# Patient Record
Sex: FEMALE | Race: WHITE | NOT HISPANIC OR LATINO | Employment: OTHER | URBAN - METROPOLITAN AREA
[De-identification: names, ages, dates, MRNs, and addresses within clinical notes are randomized per-mention and may not be internally consistent; named-entity substitution may affect disease eponyms.]

---

## 2020-04-27 ENCOUNTER — OFFICE VISIT (OUTPATIENT)
Dept: FAMILY MEDICINE CLINIC | Facility: CLINIC | Age: 55
End: 2020-04-27

## 2020-04-27 VITALS
SYSTOLIC BLOOD PRESSURE: 126 MMHG | WEIGHT: 232.4 LBS | HEIGHT: 66 IN | TEMPERATURE: 99.2 F | BODY MASS INDEX: 37.35 KG/M2 | DIASTOLIC BLOOD PRESSURE: 76 MMHG | HEART RATE: 72 BPM | RESPIRATION RATE: 18 BRPM

## 2020-04-27 DIAGNOSIS — M25.561 CHRONIC PAIN OF RIGHT KNEE: ICD-10-CM

## 2020-04-27 DIAGNOSIS — M54.50 LUMBAR BACK PAIN: ICD-10-CM

## 2020-04-27 DIAGNOSIS — M25.562 CHRONIC PAIN OF LEFT KNEE: Primary | ICD-10-CM

## 2020-04-27 DIAGNOSIS — I10 ESSENTIAL HYPERTENSION: ICD-10-CM

## 2020-04-27 DIAGNOSIS — G89.29 CHRONIC PAIN OF LEFT KNEE: Primary | ICD-10-CM

## 2020-04-27 DIAGNOSIS — G62.9 NEUROPATHY: ICD-10-CM

## 2020-04-27 DIAGNOSIS — G89.29 CHRONIC PAIN OF RIGHT KNEE: ICD-10-CM

## 2020-04-27 DIAGNOSIS — M25.552 LEFT HIP PAIN: ICD-10-CM

## 2020-04-27 LAB
ALBUMIN SERPL-MCNC: 4.4 G/DL (ref 3.5–5.2)
ALBUMIN/GLOB SERPL: 1.6 G/DL
ALP SERPL-CCNC: 69 U/L (ref 39–117)
ALT SERPL-CCNC: 21 U/L (ref 1–33)
AST SERPL-CCNC: 17 U/L (ref 1–32)
BASOPHILS # BLD AUTO: 0.05 10*3/MM3 (ref 0–0.2)
BASOPHILS NFR BLD AUTO: 1 % (ref 0–1.5)
BILIRUB SERPL-MCNC: 0.4 MG/DL (ref 0.2–1.2)
BUN SERPL-MCNC: 19 MG/DL (ref 6–20)
BUN/CREAT SERPL: 20.9 (ref 7–25)
CALCIUM SERPL-MCNC: 9.4 MG/DL (ref 8.6–10.5)
CHLORIDE SERPL-SCNC: 98 MMOL/L (ref 98–107)
CHOLEST SERPL-MCNC: 236 MG/DL (ref 0–200)
CHOLEST/HDLC SERPL: 3.52 {RATIO}
CO2 SERPL-SCNC: 28.2 MMOL/L (ref 22–29)
CREAT SERPL-MCNC: 0.91 MG/DL (ref 0.57–1)
EOSINOPHIL # BLD AUTO: 0.31 10*3/MM3 (ref 0–0.4)
EOSINOPHIL NFR BLD AUTO: 6 % (ref 0.3–6.2)
ERYTHROCYTE [DISTWIDTH] IN BLOOD BY AUTOMATED COUNT: 11.8 % (ref 12.3–15.4)
GLOBULIN SER CALC-MCNC: 2.8 GM/DL
GLUCOSE SERPL-MCNC: 98 MG/DL (ref 65–99)
HCT VFR BLD AUTO: 38.8 % (ref 34–46.6)
HDLC SERPL-MCNC: 67 MG/DL (ref 40–60)
HGB BLD-MCNC: 12.8 G/DL (ref 12–15.9)
IMM GRANULOCYTES # BLD AUTO: 0.02 10*3/MM3 (ref 0–0.05)
IMM GRANULOCYTES NFR BLD AUTO: 0.4 % (ref 0–0.5)
LDLC SERPL CALC-MCNC: 137 MG/DL (ref 0–100)
LYMPHOCYTES # BLD AUTO: 1.83 10*3/MM3 (ref 0.7–3.1)
LYMPHOCYTES NFR BLD AUTO: 35.7 % (ref 19.6–45.3)
MCH RBC QN AUTO: 29.7 PG (ref 26.6–33)
MCHC RBC AUTO-ENTMCNC: 33 G/DL (ref 31.5–35.7)
MCV RBC AUTO: 90 FL (ref 79–97)
MONOCYTES # BLD AUTO: 0.34 10*3/MM3 (ref 0.1–0.9)
MONOCYTES NFR BLD AUTO: 6.6 % (ref 5–12)
NEUTROPHILS # BLD AUTO: 2.58 10*3/MM3 (ref 1.7–7)
NEUTROPHILS NFR BLD AUTO: 50.3 % (ref 42.7–76)
NRBC BLD AUTO-RTO: 0 /100 WBC (ref 0–0.2)
PLATELET # BLD AUTO: 265 10*3/MM3 (ref 140–450)
POTASSIUM SERPL-SCNC: 4.3 MMOL/L (ref 3.5–5.2)
PROT SERPL-MCNC: 7.2 G/DL (ref 6–8.5)
RBC # BLD AUTO: 4.31 10*6/MM3 (ref 3.77–5.28)
SODIUM SERPL-SCNC: 136 MMOL/L (ref 136–145)
TRIGL SERPL-MCNC: 161 MG/DL (ref 0–150)
VLDLC SERPL CALC-MCNC: 32.2 MG/DL (ref 5–40)
WBC # BLD AUTO: 5.13 10*3/MM3 (ref 3.4–10.8)

## 2020-04-27 PROCEDURE — 99203 OFFICE O/P NEW LOW 30 MIN: CPT | Performed by: FAMILY MEDICINE

## 2020-04-27 RX ORDER — MELOXICAM 15 MG/1
15 TABLET ORAL
COMMUNITY
End: 2020-05-28 | Stop reason: SDUPTHER

## 2020-04-27 RX ORDER — GABAPENTIN 600 MG/1
600 TABLET ORAL 2 TIMES DAILY
Qty: 60 TABLET | Refills: 0 | COMMUNITY
Start: 2020-04-27 | End: 2020-05-26 | Stop reason: SDUPTHER

## 2020-04-27 RX ORDER — LISINOPRIL AND HYDROCHLOROTHIAZIDE 20; 12.5 MG/1; MG/1
1 TABLET ORAL DAILY
COMMUNITY
Start: 2020-04-27 | End: 2020-05-26 | Stop reason: SDUPTHER

## 2020-04-27 RX ORDER — GABAPENTIN 800 MG/1
800 TABLET ORAL 2 TIMES DAILY
COMMUNITY
End: 2020-04-27

## 2020-04-27 RX ORDER — ACETAMINOPHEN 500 MG
500 TABLET ORAL EVERY 6 HOURS PRN
COMMUNITY
End: 2021-03-01
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
## 2020-05-26 DIAGNOSIS — M25.562 CHRONIC PAIN OF LEFT KNEE: ICD-10-CM

## 2020-05-26 DIAGNOSIS — G62.9 NEUROPATHY: ICD-10-CM

## 2020-05-26 DIAGNOSIS — G89.29 CHRONIC PAIN OF LEFT KNEE: ICD-10-CM

## 2020-05-26 RX ORDER — LISINOPRIL AND HYDROCHLOROTHIAZIDE 20; 12.5 MG/1; MG/1
1 TABLET ORAL DAILY
Qty: 30 TABLET | Refills: 1 | Status: SHIPPED | OUTPATIENT
Start: 2020-05-26 | End: 2020-07-02 | Stop reason: SDUPTHER

## 2020-05-26 RX ORDER — GABAPENTIN 600 MG/1
600 TABLET ORAL 2 TIMES DAILY
Qty: 60 TABLET | Refills: 0 | Status: SHIPPED | OUTPATIENT
Start: 2020-05-26 | End: 2020-06-25 | Stop reason: SDUPTHER

## 2020-05-28 RX ORDER — MELOXICAM 15 MG/1
15 TABLET ORAL DAILY
Qty: 30 TABLET | Refills: 1 | Status: SHIPPED | OUTPATIENT
Start: 2020-05-28 | End: 2020-06-25

## 2020-06-01 ENCOUNTER — TELEMEDICINE (OUTPATIENT)
Dept: FAMILY MEDICINE CLINIC | Facility: CLINIC | Age: 55
End: 2020-06-01

## 2020-06-01 DIAGNOSIS — G89.29 CHRONIC PAIN OF RIGHT KNEE: ICD-10-CM

## 2020-06-01 DIAGNOSIS — F32.A ANXIETY AND DEPRESSION: ICD-10-CM

## 2020-06-01 DIAGNOSIS — M25.562 CHRONIC PAIN OF LEFT KNEE: Primary | ICD-10-CM

## 2020-06-01 DIAGNOSIS — G89.29 CHRONIC PAIN OF LEFT KNEE: Primary | ICD-10-CM

## 2020-06-01 DIAGNOSIS — M54.50 LUMBAR BACK PAIN: ICD-10-CM

## 2020-06-01 DIAGNOSIS — M25.561 CHRONIC PAIN OF RIGHT KNEE: ICD-10-CM

## 2020-06-01 DIAGNOSIS — F41.9 ANXIETY AND DEPRESSION: ICD-10-CM

## 2020-06-01 PROCEDURE — 99213 OFFICE O/P EST LOW 20 MIN: CPT | Performed by: FAMILY MEDICINE

## 2020-06-01 RX ORDER — TRAMADOL HYDROCHLORIDE 50 MG/1
50 TABLET ORAL EVERY 6 HOURS PRN
Qty: 60 TABLET | Refills: 0 | Status: SHIPPED | OUTPATIENT
Start: 2020-06-01 | End: 2020-06-08 | Stop reason: SDUPTHER

## 2020-06-01 RX ORDER — TIZANIDINE 4 MG/1
4-8 TABLET ORAL NIGHTLY PRN
Qty: 60 TABLET | Refills: 1 | Status: SHIPPED | OUTPATIENT
Start: 2020-06-01 | End: 2020-07-20 | Stop reason: SDUPTHER

## 2020-06-01 RX ORDER — ESCITALOPRAM OXALATE 10 MG/1
10 TABLET ORAL DAILY
Qty: 30 TABLET | Refills: 1 | Status: SHIPPED | OUTPATIENT
Start: 2020-06-01 | End: 2020-07-14

## 2020-06-01 NOTE — PROGRESS NOTES
This was an audio and video enabled telemedicine encounter.     You have chosen to receive care through a telehealth visit.  Do you consent to use a video/audio connection for your medical care today? Yes     Time spent: 12 min total with patient in discussion  .  Assessment/Plan       Problems Addressed this Visit     None      Visit Diagnoses     Chronic pain of left knee    -  Primary    Relevant Medications    traMADol (ULTRAM) 50 MG tablet    tiZANidine (ZANAFLEX) 4 MG tablet    Chronic pain of right knee        Relevant Medications    traMADol (ULTRAM) 50 MG tablet    tiZANidine (ZANAFLEX) 4 MG tablet    Lumbar back pain        Relevant Medications    traMADol (ULTRAM) 50 MG tablet    tiZANidine (ZANAFLEX) 4 MG tablet    Anxiety and depression        Relevant Medications    escitalopram (Lexapro) 10 MG tablet            Follow up: Return in about 1 month (around 7/1/2020).     DISCUSSION  Chronic pain of left and right knee.  Has had multiple surgeries.  Lumbar back pain.  Previously did well on tramadol 50 mg twice a day.  Will refill this.  Trial of tizanidine as well.  Follow-up in the office in 1 month for chronic pain management and will need a pain management agreement.    Anxiety and depression.  Trial of Lexapro.  Denies suicidal ideation.            MEDICATIONS PRESCRIBED  Requested Prescriptions     Signed Prescriptions Disp Refills   • traMADol (ULTRAM) 50 MG tablet 60 tablet 0     Sig: Take 1 tablet by mouth Every 6 (Six) Hours As Needed for Moderate Pain .   • tiZANidine (ZANAFLEX) 4 MG tablet 60 tablet 1     Sig: Take 1-2 tablets by mouth At Night As Needed for Muscle Spasms.   • escitalopram (Lexapro) 10 MG tablet 30 tablet 1     Sig: Take 1 tablet by mouth Daily.             Cristiano dated on April 27, 2020 was reviewed and appropriate.        -------------------------------------------    Subjective     Chief Complaint   Patient presents with   • Pain         History of Present  Illness    Arthritis/pain  Pain still  Had knee surg, 3 knee surg left knee  Was told needed right TKR  Meloxicam helps with the stiffness  Does not help with the pain    Ultram in the past  Was taking 2 per day  Stopped taking when moved here to [place]  Tylenol was not helpful    Has no had since July 2018      Lower back  Muscled getting tight  Walking with a limp   burning pain in leg and numbness in toes  Left leg    Anxiety  Had been on celexa   Then changed to Lexapro  Increased anxiety now  Situation was better and then stopped the meds  Now + anxiety and irritable again  Stays tense and worry all the time  No suicidal ideation  Some depressed mood as well          Social History     Tobacco Use   Smoking Status Never Smoker   Smokeless Tobacco Never Used          Past Medical History,Medications, Allergies, and social history was reviewed.          Review of Systems   Constitutional: Negative.    HENT: Negative.    Respiratory: Negative.    Cardiovascular: Negative.    Musculoskeletal: Positive for arthralgias and back pain.   Psychiatric/Behavioral: Positive for depressed mood. Negative for suicidal ideas. The patient is nervous/anxious.        Objective     Unable to do vital signs since video visit      Physical Exam   Constitutional: She appears well-developed and well-nourished. No distress.   HENT:   Head: Normocephalic.   Eyes: EOM are normal.   Neck: Neck normal appearance.  Pulmonary/Chest: Effort normal.   Neurological: She is alert.   Psychiatric: She has a normal mood and affect. Her speech is normal.          Unable to do full physical examination due to video visit            Dl Lucas MD

## 2020-06-05 DIAGNOSIS — F41.9 ANXIETY AND DEPRESSION: Primary | ICD-10-CM

## 2020-06-05 DIAGNOSIS — F32.A ANXIETY AND DEPRESSION: Primary | ICD-10-CM

## 2020-06-05 RX ORDER — BUPROPION HYDROCHLORIDE 150 MG/1
TABLET, EXTENDED RELEASE ORAL
Qty: 60 TABLET | Refills: 1 | Status: SHIPPED | OUTPATIENT
Start: 2020-06-05 | End: 2020-07-14

## 2020-06-08 DIAGNOSIS — M25.561 CHRONIC PAIN OF RIGHT KNEE: ICD-10-CM

## 2020-06-08 DIAGNOSIS — M25.562 CHRONIC PAIN OF LEFT KNEE: ICD-10-CM

## 2020-06-08 DIAGNOSIS — G89.29 CHRONIC PAIN OF LEFT KNEE: ICD-10-CM

## 2020-06-08 DIAGNOSIS — M54.50 LUMBAR BACK PAIN: ICD-10-CM

## 2020-06-08 DIAGNOSIS — G89.29 CHRONIC PAIN OF RIGHT KNEE: ICD-10-CM

## 2020-06-08 RX ORDER — TRAMADOL HYDROCHLORIDE 50 MG/1
50 TABLET ORAL EVERY 6 HOURS PRN
Qty: 60 TABLET | Refills: 2 | Status: SHIPPED | OUTPATIENT
Start: 2020-06-08 | End: 2020-07-20 | Stop reason: SDUPTHER

## 2020-06-08 RX ORDER — TRAMADOL HYDROCHLORIDE 50 MG/1
TABLET ORAL
Qty: 60 TABLET | Refills: 0 | OUTPATIENT
Start: 2020-06-08

## 2020-06-18 DIAGNOSIS — F32.A ANXIETY AND DEPRESSION: Primary | ICD-10-CM

## 2020-06-18 DIAGNOSIS — F41.9 ANXIETY AND DEPRESSION: Primary | ICD-10-CM

## 2020-06-18 RX ORDER — QUETIAPINE FUMARATE 25 MG/1
25 TABLET, FILM COATED ORAL NIGHTLY
Qty: 30 TABLET | Refills: 1 | Status: SHIPPED | OUTPATIENT
Start: 2020-06-18 | End: 2020-07-14

## 2020-06-22 DIAGNOSIS — G89.29 CHRONIC PAIN OF RIGHT KNEE: ICD-10-CM

## 2020-06-22 DIAGNOSIS — M25.561 CHRONIC PAIN OF RIGHT KNEE: ICD-10-CM

## 2020-06-22 DIAGNOSIS — M25.562 CHRONIC PAIN OF LEFT KNEE: ICD-10-CM

## 2020-06-22 DIAGNOSIS — M54.50 LUMBAR BACK PAIN: ICD-10-CM

## 2020-06-22 DIAGNOSIS — G89.29 CHRONIC PAIN OF LEFT KNEE: ICD-10-CM

## 2020-06-22 RX ORDER — TRAMADOL HYDROCHLORIDE 50 MG/1
50 TABLET ORAL EVERY 6 HOURS PRN
Qty: 60 TABLET | Refills: 2 | Status: CANCELLED | OUTPATIENT
Start: 2020-06-22

## 2020-06-22 NOTE — TELEPHONE ENCOUNTER
Please call patient.  We received a refill request for tramadol but she got #60 on June 8, 2020 +2 refills.  That would be 180 pills used in 14 days.  Please confirm.

## 2020-06-25 DIAGNOSIS — G89.29 CHRONIC PAIN OF LEFT KNEE: ICD-10-CM

## 2020-06-25 DIAGNOSIS — G62.9 NEUROPATHY: ICD-10-CM

## 2020-06-25 DIAGNOSIS — M25.562 CHRONIC PAIN OF LEFT KNEE: ICD-10-CM

## 2020-06-25 RX ORDER — GABAPENTIN 600 MG/1
TABLET ORAL
Qty: 60 TABLET | Refills: 0 | OUTPATIENT
Start: 2020-06-25

## 2020-06-25 RX ORDER — GABAPENTIN 600 MG/1
600 TABLET ORAL 2 TIMES DAILY
Qty: 60 TABLET | Refills: 0 | Status: CANCELLED | OUTPATIENT
Start: 2020-06-25

## 2020-06-25 RX ORDER — GABAPENTIN 600 MG/1
600 TABLET ORAL 2 TIMES DAILY
Qty: 60 TABLET | Refills: 1 | Status: SHIPPED | OUTPATIENT
Start: 2020-06-25 | End: 2020-08-20 | Stop reason: SDUPTHER

## 2020-06-25 RX ORDER — MELOXICAM 15 MG/1
TABLET ORAL
Qty: 30 TABLET | Refills: 1 | Status: SHIPPED | OUTPATIENT
Start: 2020-06-25 | End: 2020-07-20 | Stop reason: SDUPTHER

## 2020-06-25 NOTE — TELEPHONE ENCOUNTER
LVM for the patient to return our call, office number was provided. Patient has an appointment on 07/02/2020, it can be addressed then.

## 2020-07-02 RX ORDER — LISINOPRIL AND HYDROCHLOROTHIAZIDE 20; 12.5 MG/1; MG/1
1 TABLET ORAL DAILY
Qty: 90 TABLET | Refills: 0 | Status: SHIPPED | OUTPATIENT
Start: 2020-07-02 | End: 2020-11-04 | Stop reason: SDUPTHER

## 2020-07-14 ENCOUNTER — OFFICE VISIT (OUTPATIENT)
Dept: FAMILY MEDICINE CLINIC | Facility: CLINIC | Age: 55
End: 2020-07-14

## 2020-07-14 VITALS
TEMPERATURE: 97.8 F | HEART RATE: 74 BPM | SYSTOLIC BLOOD PRESSURE: 130 MMHG | DIASTOLIC BLOOD PRESSURE: 76 MMHG | WEIGHT: 230 LBS | HEIGHT: 66 IN | RESPIRATION RATE: 18 BRPM | BODY MASS INDEX: 36.96 KG/M2

## 2020-07-14 DIAGNOSIS — G89.29 CHRONIC PAIN OF RIGHT KNEE: ICD-10-CM

## 2020-07-14 DIAGNOSIS — F32.A ANXIETY AND DEPRESSION: ICD-10-CM

## 2020-07-14 DIAGNOSIS — G89.29 CHRONIC PAIN OF LEFT KNEE: Primary | ICD-10-CM

## 2020-07-14 DIAGNOSIS — F41.9 ANXIETY AND DEPRESSION: ICD-10-CM

## 2020-07-14 DIAGNOSIS — M25.552 LEFT HIP PAIN: ICD-10-CM

## 2020-07-14 DIAGNOSIS — M25.561 CHRONIC PAIN OF RIGHT KNEE: ICD-10-CM

## 2020-07-14 DIAGNOSIS — M54.50 LUMBAR BACK PAIN: ICD-10-CM

## 2020-07-14 DIAGNOSIS — M25.562 CHRONIC PAIN OF LEFT KNEE: Primary | ICD-10-CM

## 2020-07-14 PROCEDURE — 99213 OFFICE O/P EST LOW 20 MIN: CPT | Performed by: FAMILY MEDICINE

## 2020-07-14 NOTE — PROGRESS NOTES
Assessment/Plan       Problems Addressed this Visit     None      Visit Diagnoses     Chronic pain of left knee    -  Primary    Chronic pain of right knee        Lumbar back pain        Left hip pain        Anxiety and depression                Follow up: Return in about 3 months (around 10/14/2020).     DISCUSSION  Chronic pain of both knees, lumbar back and left hip.  Pain manager agreement signed today.  We will continue tramadol and gabapentin.  Denies misuse of diversion.  Understands risk of addiction.  Understands side effects.  Follow-up in 3 months.        Anxiety.  Unable to afford the Seroquel.  Has not done well with previous less expensive medications.  We will continue to monitor.  She would like to stay off medication at this time.      MEDICATIONS PRESCRIBED  Requested Prescriptions      No prescriptions requested or ordered in this encounter            Cristiano dated on April 27, 2020 was reviewed and appropriate.        -------------------------------------------    Subjective     Chief Complaint   Patient presents with   • Knee Pain     f/u         History of Present Illness    Chronic pain   Takes gabapentin and tramadol  Tramadol 4 time per day helps  Helps her function   No misuse  No drugs  Numbness and tingling in leg      Anxiety  Unable to afford the $9 copay for the seroquel  No meds now  Doing o  No SI  Gets nervous and shaky  Some panic attacks at times  + stress at times            Social History     Tobacco Use   Smoking Status Never Smoker   Smokeless Tobacco Never Used          Past Medical History,Medications, Allergies, and social history was reviewed.          Review of Systems   Constitutional: Negative.    HENT: Negative.    Respiratory: Negative.    Cardiovascular: Negative.    Gastrointestinal: Negative.    Musculoskeletal: Positive for arthralgias.       Objective     Vitals:    07/14/20 1124   BP: 130/76   Pulse: 74   Resp: 18   Temp: 97.8 °F (36.6 °C)   Weight: 104 kg (230  "lb)   Height: 167.6 cm (66\")          Physical Exam   Constitutional: She is oriented to person, place, and time. She appears well-developed and well-nourished.   HENT:   Head: Normocephalic and atraumatic.   Right Ear: Hearing and external ear normal.   Left Ear: Hearing and external ear normal.   Mouth/Throat: Oropharynx is clear and moist.   Eyes: Pupils are equal, round, and reactive to light. Conjunctivae and EOM are normal.   Cardiovascular: Normal rate, regular rhythm and normal heart sounds. Exam reveals no friction rub.   No murmur heard.  Pulmonary/Chest: Effort normal and breath sounds normal. No respiratory distress. She has no wheezes. She has no rales.   Musculoskeletal:        Right knee: She exhibits decreased range of motion. She exhibits no swelling and no effusion. Tenderness (diffuse) found.        Left knee: She exhibits decreased range of motion (crepitus). Tenderness ( diffuse) found.   Neurological: She is alert and oriented to person, place, and time.   Skin: Skin is warm.   Psychiatric: She has a normal mood and affect. Her behavior is normal.   Nursing note and vitals reviewed.                Dl Lucas MD    "

## 2020-07-20 DIAGNOSIS — G89.29 CHRONIC PAIN OF RIGHT KNEE: ICD-10-CM

## 2020-07-20 DIAGNOSIS — G89.29 CHRONIC PAIN OF LEFT KNEE: ICD-10-CM

## 2020-07-20 DIAGNOSIS — M54.50 LUMBAR BACK PAIN: ICD-10-CM

## 2020-07-20 DIAGNOSIS — M25.562 CHRONIC PAIN OF LEFT KNEE: ICD-10-CM

## 2020-07-20 DIAGNOSIS — M25.561 CHRONIC PAIN OF RIGHT KNEE: ICD-10-CM

## 2020-07-20 RX ORDER — MELOXICAM 15 MG/1
15 TABLET ORAL DAILY
Qty: 30 TABLET | Refills: 2 | Status: SHIPPED | OUTPATIENT
Start: 2020-07-20 | End: 2020-12-28 | Stop reason: SDUPTHER

## 2020-07-20 RX ORDER — TRAMADOL HYDROCHLORIDE 50 MG/1
50 TABLET ORAL EVERY 6 HOURS PRN
Qty: 60 TABLET | Refills: 2 | Status: SHIPPED | OUTPATIENT
Start: 2020-07-20 | End: 2020-08-31 | Stop reason: SDUPTHER

## 2020-07-20 RX ORDER — TRAMADOL HYDROCHLORIDE 50 MG/1
50 TABLET ORAL EVERY 6 HOURS PRN
Qty: 60 TABLET | Refills: 2 | Status: CANCELLED | OUTPATIENT
Start: 2020-07-20

## 2020-07-20 RX ORDER — TIZANIDINE 4 MG/1
4-8 TABLET ORAL NIGHTLY PRN
Qty: 60 TABLET | Refills: 2 | Status: SHIPPED | OUTPATIENT
Start: 2020-07-20 | End: 2020-12-01 | Stop reason: SDUPTHER

## 2020-08-20 DIAGNOSIS — G89.29 CHRONIC PAIN OF LEFT KNEE: ICD-10-CM

## 2020-08-20 DIAGNOSIS — G62.9 NEUROPATHY: ICD-10-CM

## 2020-08-20 DIAGNOSIS — M25.562 CHRONIC PAIN OF LEFT KNEE: ICD-10-CM

## 2020-08-20 RX ORDER — GABAPENTIN 600 MG/1
600 TABLET ORAL 2 TIMES DAILY
Qty: 60 TABLET | Refills: 1 | Status: SHIPPED | OUTPATIENT
Start: 2020-08-20 | End: 2020-10-15 | Stop reason: SDUPTHER

## 2020-08-31 DIAGNOSIS — M25.561 CHRONIC PAIN OF RIGHT KNEE: ICD-10-CM

## 2020-08-31 DIAGNOSIS — M54.50 LUMBAR BACK PAIN: ICD-10-CM

## 2020-08-31 DIAGNOSIS — M25.562 CHRONIC PAIN OF LEFT KNEE: ICD-10-CM

## 2020-08-31 DIAGNOSIS — G89.29 CHRONIC PAIN OF RIGHT KNEE: ICD-10-CM

## 2020-08-31 DIAGNOSIS — G89.29 CHRONIC PAIN OF LEFT KNEE: ICD-10-CM

## 2020-08-31 RX ORDER — TRAMADOL HYDROCHLORIDE 50 MG/1
50 TABLET ORAL EVERY 6 HOURS PRN
Qty: 60 TABLET | Refills: 2 | Status: SHIPPED | OUTPATIENT
Start: 2020-08-31 | End: 2020-10-11 | Stop reason: SDUPTHER

## 2020-08-31 RX ORDER — TRAMADOL HYDROCHLORIDE 50 MG/1
50 TABLET ORAL EVERY 6 HOURS PRN
Qty: 60 TABLET | Refills: 2 | Status: CANCELLED | OUTPATIENT
Start: 2020-08-31

## 2020-09-15 DIAGNOSIS — M25.50 ARTHRALGIA OF MULTIPLE JOINTS: Primary | ICD-10-CM

## 2020-09-15 RX ORDER — LIDOCAINE 50 MG/G
OINTMENT TOPICAL 2 TIMES DAILY
Qty: 50 G | Refills: 1 | Status: SHIPPED | OUTPATIENT
Start: 2020-09-15 | End: 2020-11-06 | Stop reason: SDUPTHER

## 2020-10-11 DIAGNOSIS — M54.50 LUMBAR BACK PAIN: ICD-10-CM

## 2020-10-11 DIAGNOSIS — M25.561 CHRONIC PAIN OF RIGHT KNEE: ICD-10-CM

## 2020-10-11 DIAGNOSIS — G89.29 CHRONIC PAIN OF RIGHT KNEE: ICD-10-CM

## 2020-10-11 DIAGNOSIS — M25.562 CHRONIC PAIN OF LEFT KNEE: ICD-10-CM

## 2020-10-11 DIAGNOSIS — G89.29 CHRONIC PAIN OF LEFT KNEE: ICD-10-CM

## 2020-10-12 DIAGNOSIS — M25.562 CHRONIC PAIN OF LEFT KNEE: ICD-10-CM

## 2020-10-12 DIAGNOSIS — M25.561 CHRONIC PAIN OF RIGHT KNEE: ICD-10-CM

## 2020-10-12 DIAGNOSIS — M54.50 LUMBAR BACK PAIN: ICD-10-CM

## 2020-10-12 DIAGNOSIS — G89.29 CHRONIC PAIN OF RIGHT KNEE: ICD-10-CM

## 2020-10-12 DIAGNOSIS — G89.29 CHRONIC PAIN OF LEFT KNEE: ICD-10-CM

## 2020-10-12 RX ORDER — TRAMADOL HYDROCHLORIDE 50 MG/1
TABLET ORAL
Qty: 60 TABLET | Refills: 0 | OUTPATIENT
Start: 2020-10-12

## 2020-10-12 RX ORDER — TRAMADOL HYDROCHLORIDE 50 MG/1
50 TABLET ORAL EVERY 6 HOURS PRN
Qty: 60 TABLET | Refills: 0 | Status: SHIPPED | OUTPATIENT
Start: 2020-10-12 | End: 2020-10-26 | Stop reason: SDUPTHER

## 2020-10-12 RX ORDER — TRAMADOL HYDROCHLORIDE 50 MG/1
50 TABLET ORAL EVERY 6 HOURS PRN
Qty: 60 TABLET | Refills: 2 | Status: CANCELLED | OUTPATIENT
Start: 2020-10-12

## 2020-10-15 DIAGNOSIS — G89.29 CHRONIC PAIN OF LEFT KNEE: ICD-10-CM

## 2020-10-15 DIAGNOSIS — G62.9 NEUROPATHY: ICD-10-CM

## 2020-10-15 DIAGNOSIS — M25.562 CHRONIC PAIN OF LEFT KNEE: ICD-10-CM

## 2020-10-15 RX ORDER — GABAPENTIN 600 MG/1
600 TABLET ORAL 2 TIMES DAILY
Qty: 60 TABLET | Refills: 0 | Status: SHIPPED | OUTPATIENT
Start: 2020-10-15 | End: 2020-11-12 | Stop reason: SDUPTHER

## 2020-10-26 DIAGNOSIS — G89.29 CHRONIC PAIN OF LEFT KNEE: ICD-10-CM

## 2020-10-26 DIAGNOSIS — M25.562 CHRONIC PAIN OF LEFT KNEE: ICD-10-CM

## 2020-10-26 DIAGNOSIS — G89.29 CHRONIC PAIN OF RIGHT KNEE: ICD-10-CM

## 2020-10-26 DIAGNOSIS — M54.50 LUMBAR BACK PAIN: ICD-10-CM

## 2020-10-26 DIAGNOSIS — M25.561 CHRONIC PAIN OF RIGHT KNEE: ICD-10-CM

## 2020-10-26 RX ORDER — TRAMADOL HYDROCHLORIDE 50 MG/1
50 TABLET ORAL EVERY 6 HOURS PRN
Qty: 60 TABLET | Refills: 0 | Status: SHIPPED | OUTPATIENT
Start: 2020-10-26 | End: 2020-11-09 | Stop reason: SDUPTHER

## 2020-11-04 RX ORDER — LISINOPRIL AND HYDROCHLOROTHIAZIDE 20; 12.5 MG/1; MG/1
1 TABLET ORAL DAILY
Qty: 90 TABLET | Refills: 0 | Status: SHIPPED | OUTPATIENT
Start: 2020-11-04 | End: 2021-02-01 | Stop reason: SDUPTHER

## 2020-11-05 ENCOUNTER — OFFICE VISIT (OUTPATIENT)
Dept: FAMILY MEDICINE CLINIC | Facility: CLINIC | Age: 55
End: 2020-11-05

## 2020-11-05 ENCOUNTER — HOSPITAL ENCOUNTER (OUTPATIENT)
Dept: GENERAL RADIOLOGY | Facility: HOSPITAL | Age: 55
Discharge: HOME | End: 2020-11-05
Admitting: FAMILY MEDICINE

## 2020-11-05 VITALS
HEART RATE: 78 BPM | WEIGHT: 236 LBS | TEMPERATURE: 98.2 F | DIASTOLIC BLOOD PRESSURE: 80 MMHG | HEIGHT: 66 IN | BODY MASS INDEX: 37.93 KG/M2 | RESPIRATION RATE: 18 BRPM | SYSTOLIC BLOOD PRESSURE: 122 MMHG

## 2020-11-05 DIAGNOSIS — M25.552 LEFT HIP PAIN: ICD-10-CM

## 2020-11-05 DIAGNOSIS — M54.50 LUMBAR BACK PAIN: ICD-10-CM

## 2020-11-05 DIAGNOSIS — E78.2 MIXED HYPERLIPIDEMIA: ICD-10-CM

## 2020-11-05 DIAGNOSIS — G89.29 CHRONIC PAIN OF LEFT KNEE: Primary | ICD-10-CM

## 2020-11-05 DIAGNOSIS — G89.29 CHRONIC PAIN OF RIGHT KNEE: ICD-10-CM

## 2020-11-05 DIAGNOSIS — F32.A ANXIETY AND DEPRESSION: ICD-10-CM

## 2020-11-05 DIAGNOSIS — G62.9 NEUROPATHY: ICD-10-CM

## 2020-11-05 DIAGNOSIS — M79.672 LEFT FOOT PAIN: ICD-10-CM

## 2020-11-05 DIAGNOSIS — M25.562 CHRONIC PAIN OF LEFT KNEE: Primary | ICD-10-CM

## 2020-11-05 DIAGNOSIS — M25.561 CHRONIC PAIN OF RIGHT KNEE: ICD-10-CM

## 2020-11-05 DIAGNOSIS — I10 ESSENTIAL HYPERTENSION: ICD-10-CM

## 2020-11-05 DIAGNOSIS — F41.9 ANXIETY AND DEPRESSION: ICD-10-CM

## 2020-11-05 LAB
ALBUMIN SERPL-MCNC: 4.8 G/DL (ref 3.5–5.2)
ALBUMIN/GLOB SERPL: 2.5 G/DL
ALP SERPL-CCNC: 59 U/L (ref 39–117)
ALT SERPL-CCNC: 11 U/L (ref 1–33)
AST SERPL-CCNC: 17 U/L (ref 1–32)
BILIRUB SERPL-MCNC: 0.3 MG/DL (ref 0–1.2)
BUN SERPL-MCNC: 16 MG/DL (ref 6–20)
BUN/CREAT SERPL: 20.3 (ref 7–25)
CALCIUM SERPL-MCNC: 9.3 MG/DL (ref 8.6–10.5)
CHLORIDE SERPL-SCNC: 102 MMOL/L (ref 98–107)
CHOLEST SERPL-MCNC: 182 MG/DL (ref 0–200)
CHOLEST/HDLC SERPL: 2.68 {RATIO}
CO2 SERPL-SCNC: 28.9 MMOL/L (ref 22–29)
CREAT SERPL-MCNC: 0.79 MG/DL (ref 0.57–1)
GLOBULIN SER CALC-MCNC: 1.9 GM/DL
GLUCOSE SERPL-MCNC: 79 MG/DL (ref 65–99)
HDLC SERPL-MCNC: 68 MG/DL (ref 40–60)
LDLC SERPL CALC-MCNC: 98 MG/DL (ref 0–100)
POTASSIUM SERPL-SCNC: 4.6 MMOL/L (ref 3.5–5.2)
PROT SERPL-MCNC: 6.7 G/DL (ref 6–8.5)
SODIUM SERPL-SCNC: 141 MMOL/L (ref 136–145)
TRIGL SERPL-MCNC: 86 MG/DL (ref 0–150)
VLDLC SERPL CALC-MCNC: 16 MG/DL (ref 5–40)

## 2020-11-05 PROCEDURE — 73630 X-RAY EXAM OF FOOT: CPT

## 2020-11-05 PROCEDURE — 72100 X-RAY EXAM L-S SPINE 2/3 VWS: CPT

## 2020-11-05 PROCEDURE — 73560 X-RAY EXAM OF KNEE 1 OR 2: CPT

## 2020-11-05 PROCEDURE — 99214 OFFICE O/P EST MOD 30 MIN: CPT | Performed by: FAMILY MEDICINE

## 2020-11-05 PROCEDURE — 73502 X-RAY EXAM HIP UNI 2-3 VIEWS: CPT

## 2020-11-05 RX ORDER — CITALOPRAM 20 MG/1
20 TABLET ORAL DAILY
Qty: 30 TABLET | Refills: 5 | Status: SHIPPED | OUTPATIENT
Start: 2020-11-05 | End: 2021-01-06 | Stop reason: SDUPTHER

## 2020-11-05 NOTE — PROGRESS NOTES
Assessment/Plan       Diagnoses and all orders for this visit:    1. Chronic pain of left knee (Primary)  -     XR Knee 1 or 2 View Bilateral    2. Chronic pain of right knee  -     XR Knee 1 or 2 View Bilateral    3. Anxiety and depression  -     citalopram (CeleXA) 20 MG tablet; Take 1 tablet by mouth Daily.  Dispense: 30 tablet; Refill: 5    4. Essential hypertension    5. Neuropathy    6. Left hip pain  -     XR Hip With or Without Pelvis 2 - 3 View Left    7. Left foot pain  -     XR Foot 3+ View Left    8. Lumbar back pain  -     XR Spine Lumbar 2 or 3 View    9. Mixed hyperlipidemia  -     Comprehensive Metabolic Panel  -     Lipid Panel With / Chol / HDL Ratio           Follow up: No follow-ups on file.     DISCUSSION  Chronic pain of left knee, right knee, left hip and lumbar back pain.  Recommend check x-rays.  Previous orders were reordered.  Continue tramadol and gabapentin.  No evidence of misuse or diversion.    Anxiety and depression.  Restart citalopram 20 mg daily.  Call if not improving.    Hypertension.  Stable on current medications.  Check CMP and lipid panel.    Hyperlipidemia recheck CMP in the panel.    Left foot pain.  Check x-ray.    Neuropathy.  Continue gabapentin.          MEDICATIONS PRESCRIBED  Requested Prescriptions     Signed Prescriptions Disp Refills   • citalopram (CeleXA) 20 MG tablet 30 tablet 5     Sig: Take 1 tablet by mouth Daily.            Cristiano dated on 11/5/2020   was reviewed and appropriate.        -------------------------------------------    Subjective     Chief Complaint   Patient presents with   • Knee Pain     3 month f/u    • knot on foot     left          Knee Pain   The incident occurred more than 1 week ago. There was no injury mechanism. The pain is present in the left knee, left hip and right knee. The quality of the pain is described as aching. The pain is moderate. The pain has been fluctuating since onset. Associated symptoms include a loss of motion,  numbness and tingling.   Back Pain  This is a chronic problem. The current episode started more than 1 year ago. The problem occurs daily. The problem has been gradually worsening since onset. The pain is present in the sacro-iliac. The quality of the pain is described as shooting and stabbing. The pain radiates to the left knee. The pain is at a severity of 8/10. The pain is the same all the time. The symptoms are aggravated by bending, position, lying down, sitting, standing and twisting. Stiffness is present in the morning and all day. Associated symptoms include leg pain, numbness, paresthesias, tingling and weakness. Pertinent negatives include no abdominal pain, bladder incontinence, chest pain, dysuria, fever, paresis, pelvic pain, perianal numbness or weight loss. Risk factors include history of osteoporosis, lack of exercise and obesity.       Chronic pain   Bilateral knee pain and hip pain   chronic x years  May need replacement of the knee  Tramadol and gabapentin and meloxicam helps the Pain   No side effects  Able to function   Sleep decreased, Zanaflex helps with the sleep  + chronic back pain as well     - lower left side. Left foot tingles and leg numb if stands for long time  + neuropathy and pain   Left hip pain as well      Left foot  Knot medially  There for several months  Not able to wear tennis shoes  Hurts if someone steps on it  Hard to wear certain Shoes  Lidocaine helps it    Anxiety and depression  Takes spells and gets bad  Had tried med and had side effects  Had taken celexa in the past and wants to try again   + depressed mood  Sleep decreased  No suicidal ideation    HTN  Chuck has been doing well  No chest pain   No shortness of breath  No headaches  Some swelling in legs at times ( form inflamed knees)        Social History     Tobacco Use   Smoking Status Never Smoker   Smokeless Tobacco Never Used          Past Medical History,Medications, Allergies, and social history was  "reviewed.          Review of Systems   Constitutional: Negative for fever and unexpected weight loss.   HENT: Negative.    Respiratory: Negative.    Cardiovascular: Negative.  Negative for chest pain.   Gastrointestinal: Negative.  Negative for abdominal pain.   Genitourinary: Negative.  Negative for dysuria, pelvic pain and urinary incontinence.   Musculoskeletal: Positive for arthralgias and back pain.   Neurological: Positive for tingling, weakness, numbness and paresthesias.   Psychiatric/Behavioral: Positive for sleep disturbance and depressed mood. The patient is nervous/anxious.        Objective     Vitals:    11/05/20 0910   BP: 122/80   Pulse: 78   Resp: 18   Temp: 98.2 °F (36.8 °C)   Weight: 107 kg (236 lb)   Height: 167.6 cm (66\")          Physical Exam  Vitals signs and nursing note reviewed.   Constitutional:       Appearance: She is well-developed.   HENT:      Head: Normocephalic and atraumatic.      Right Ear: Hearing and external ear normal.      Left Ear: Hearing and external ear normal.   Eyes:      Conjunctiva/sclera: Conjunctivae normal.      Pupils: Pupils are equal, round, and reactive to light.   Cardiovascular:      Rate and Rhythm: Normal rate and regular rhythm.      Heart sounds: Normal heart sounds. No murmur. No friction rub.   Pulmonary:      Effort: Pulmonary effort is normal. No respiratory distress.      Breath sounds: Normal breath sounds. No wheezing or rales.   Musculoskeletal:      Left hip: She exhibits tenderness (laterally). She exhibits normal range of motion and normal strength.      Right knee: She exhibits decreased range of motion. She exhibits no swelling and no effusion. Tenderness found.      Left knee: She exhibits decreased range of motion. She exhibits no swelling and no effusion. Tenderness found.      Lumbar back: She exhibits decreased range of motion, tenderness and bony tenderness.      Left foot: Normal range of motion. Tenderness and bony tenderness (at 1st " MTP) present.   Skin:     General: Skin is warm.   Neurological:      Mental Status: She is alert and oriented to person, place, and time.   Psychiatric:         Behavior: Behavior normal.                     Dl Lucas MD

## 2020-11-06 DIAGNOSIS — M25.50 ARTHRALGIA OF MULTIPLE JOINTS: ICD-10-CM

## 2020-11-06 RX ORDER — LIDOCAINE 50 MG/G
OINTMENT TOPICAL 2 TIMES DAILY
Qty: 50 G | Refills: 1 | Status: SHIPPED | OUTPATIENT
Start: 2020-11-06 | End: 2020-12-01 | Stop reason: SDUPTHER

## 2020-11-09 DIAGNOSIS — G89.29 CHRONIC PAIN OF RIGHT KNEE: ICD-10-CM

## 2020-11-09 DIAGNOSIS — G89.29 CHRONIC PAIN OF LEFT KNEE: ICD-10-CM

## 2020-11-09 DIAGNOSIS — M25.561 CHRONIC PAIN OF RIGHT KNEE: ICD-10-CM

## 2020-11-09 DIAGNOSIS — M25.562 CHRONIC PAIN OF LEFT KNEE: ICD-10-CM

## 2020-11-09 DIAGNOSIS — M54.50 LUMBAR BACK PAIN: ICD-10-CM

## 2020-11-09 RX ORDER — TRAMADOL HYDROCHLORIDE 50 MG/1
50 TABLET ORAL EVERY 6 HOURS PRN
Qty: 60 TABLET | Refills: 2 | Status: SHIPPED | OUTPATIENT
Start: 2020-11-09 | End: 2020-12-21 | Stop reason: SDUPTHER

## 2020-11-11 DIAGNOSIS — M89.9 LESION OF FEMUR: ICD-10-CM

## 2020-11-11 DIAGNOSIS — M25.561 CHRONIC PAIN OF RIGHT KNEE: Primary | ICD-10-CM

## 2020-11-11 DIAGNOSIS — G89.29 CHRONIC PAIN OF RIGHT KNEE: Primary | ICD-10-CM

## 2020-11-11 DIAGNOSIS — M17.11 PRIMARY OSTEOARTHRITIS OF RIGHT KNEE: ICD-10-CM

## 2020-11-12 DIAGNOSIS — G62.9 NEUROPATHY: ICD-10-CM

## 2020-11-12 DIAGNOSIS — G89.29 CHRONIC PAIN OF LEFT KNEE: ICD-10-CM

## 2020-11-12 DIAGNOSIS — M25.562 CHRONIC PAIN OF LEFT KNEE: ICD-10-CM

## 2020-11-12 RX ORDER — GABAPENTIN 600 MG/1
600 TABLET ORAL 2 TIMES DAILY
Qty: 60 TABLET | Refills: 2 | Status: SHIPPED | OUTPATIENT
Start: 2020-11-12 | End: 2021-02-04 | Stop reason: SDUPTHER

## 2020-11-20 ENCOUNTER — OFFICE VISIT (OUTPATIENT)
Dept: ORTHOPEDIC SURGERY | Facility: CLINIC | Age: 55
End: 2020-11-20

## 2020-11-20 VITALS — WEIGHT: 235.89 LBS | OXYGEN SATURATION: 99 % | HEART RATE: 92 BPM | BODY MASS INDEX: 37.91 KG/M2 | HEIGHT: 66 IN

## 2020-11-20 DIAGNOSIS — M17.11 PRIMARY OSTEOARTHRITIS OF RIGHT KNEE: Primary | ICD-10-CM

## 2020-11-20 DIAGNOSIS — M17.32 POST-TRAUMATIC OSTEOARTHRITIS OF LEFT KNEE: ICD-10-CM

## 2020-11-20 PROCEDURE — 20610 DRAIN/INJ JOINT/BURSA W/O US: CPT | Performed by: ORTHOPAEDIC SURGERY

## 2020-11-20 PROCEDURE — 99204 OFFICE O/P NEW MOD 45 MIN: CPT | Performed by: ORTHOPAEDIC SURGERY

## 2020-11-20 RX ORDER — LIDOCAINE HYDROCHLORIDE 10 MG/ML
3 INJECTION, SOLUTION EPIDURAL; INFILTRATION; INTRACAUDAL; PERINEURAL
Status: COMPLETED | OUTPATIENT
Start: 2020-11-20 | End: 2020-11-20

## 2020-11-20 RX ORDER — BUPIVACAINE HYDROCHLORIDE 2.5 MG/ML
3 INJECTION, SOLUTION EPIDURAL; INFILTRATION; INTRACAUDAL
Status: COMPLETED | OUTPATIENT
Start: 2020-11-20 | End: 2020-11-20

## 2020-11-20 RX ORDER — TRIAMCINOLONE ACETONIDE 40 MG/ML
80 INJECTION, SUSPENSION INTRA-ARTICULAR; INTRAMUSCULAR
Status: COMPLETED | OUTPATIENT
Start: 2020-11-20 | End: 2020-11-20

## 2020-11-20 RX ADMIN — BUPIVACAINE HYDROCHLORIDE 3 ML: 2.5 INJECTION, SOLUTION EPIDURAL; INFILTRATION; INTRACAUDAL at 10:35

## 2020-11-20 RX ADMIN — LIDOCAINE HYDROCHLORIDE 3 ML: 10 INJECTION, SOLUTION EPIDURAL; INFILTRATION; INTRACAUDAL; PERINEURAL at 10:35

## 2020-11-20 RX ADMIN — TRIAMCINOLONE ACETONIDE 80 MG: 40 INJECTION, SUSPENSION INTRA-ARTICULAR; INTRAMUSCULAR at 10:35

## 2020-11-20 NOTE — PROGRESS NOTES
Orthopaedic Clinic Note: Knee New Patient    Chief Complaint   Patient presents with   • Left Knee - Pain   • Right Knee - Pain        HPI    Jordyn Machado is a 55 y.o. female who presents with bilateral knee pain for 5 years.  She does have a history of trauma to the left knee and when she underwent multiple surgeries for comminuted tibial plateau fracture.  She has broken hardware in that knee and states that it continues to give her daily problems.  She is primarily here due to right knee pain and a lesion which was incidentally found on x-rays.  She is complaining of aching and throbbing in that knee that she rates a 6/10 on the pain scale.  She is ambulating with no assistive device.  She is having swelling and stiffness in both knees.  He is taking Ultram as well as Neurontin for her pain.  She has had no prior interventions apart from oral anti-inflammatories.  She is here to discuss treatment for her ongoing pain.      Past Medical History:   Diagnosis Date   • Arthritis       Past Surgical History:   Procedure Laterality Date   • HIP SURGERY Left 2004   • KNEE SURGERY Left       Family History   Problem Relation Age of Onset   • Lung cancer Mother    • Lung cancer Father    • Hypertension Sister      Social History     Socioeconomic History   • Marital status: Single     Spouse name: Not on file   • Number of children: Not on file   • Years of education: Not on file   • Highest education level: Not on file   Tobacco Use   • Smoking status: Never Smoker   • Smokeless tobacco: Never Used   Substance and Sexual Activity   • Alcohol use: Yes     Comment: occasional    • Drug use: Never   • Sexual activity: Defer      Current Outpatient Medications on File Prior to Visit   Medication Sig Dispense Refill   • acetaminophen (TYLENOL) 500 MG tablet Take 500 mg by mouth Every 6 (Six) Hours As Needed for Mild Pain  (Takes 4 at least TID).     • citalopram (CeleXA) 20 MG tablet Take 1 tablet by mouth Daily. 30  "tablet 5   • gabapentin (NEURONTIN) 600 MG tablet Take 1 tablet by mouth 2 (Two) Times a Day. 60 tablet 2   • lidocaine (XYLOCAINE) 5 % ointment Apply  topically to the appropriate area as directed 2 (Two) Times a Day. As needed for pain 50 g 1   • lisinopril-hydrochlorothiazide (PRINZIDE,ZESTORETIC) 20-12.5 MG per tablet Take 1 tablet by mouth Daily. 90 tablet 0   • meloxicam (MOBIC) 15 MG tablet Take 1 tablet by mouth Daily. 30 tablet 2   • tiZANidine (ZANAFLEX) 4 MG tablet Take 1-2 tablets by mouth At Night As Needed for Muscle Spasms. 60 tablet 2   • traMADol (ULTRAM) 50 MG tablet Take 1 tablet by mouth Every 6 (Six) Hours As Needed for Moderate Pain . 60 tablet 2     No current facility-administered medications on file prior to visit.       No Known Allergies     Review of Systems   Constitutional: Negative.    HENT: Negative.    Eyes: Negative.    Respiratory: Negative.    Cardiovascular: Negative.    Gastrointestinal: Negative.    Endocrine: Negative.    Genitourinary: Negative.    Musculoskeletal: Positive for arthralgias.   Skin: Negative.    Allergic/Immunologic: Negative.    Neurological: Negative.    Hematological: Negative.    Psychiatric/Behavioral: Negative.         The patient's Review of Systems was personally reviewed and confirmed as accurate.    The following portions of the patient's history were reviewed and updated as appropriate: allergies, current medications, past family history, past medical history, past social history, past surgical history and problem list.    Physical Exam  Pulse 92, height 167.6 cm (65.98\"), weight 107 kg (235 lb 14.3 oz), SpO2 99 %.    Body mass index is 38.09 kg/m².    GENERAL APPEARANCE: awake, alert & oriented x 3, in no acute distress and well developed, well nourished  PSYCH: normal affect  LUNGS:  breathing nonlabored  EYES: sclera anicteric  CARDIOVASCULAR: palpable dorsalis pedis, palpable posterior tibial bilaterally. Capillary refill less than 2 " seconds  EXTREMITIES: no clubbing, cyanosis  GAIT:  Antalgic            Right Lower Extremity Exam:   ----------  Hip Exam  ----------  FLEXION CONTRACTURE: None  FLEXION: 110 degrees  INTERNAL ROTATION: 20 degrees at 90 degrees of flexion   EXTERNAL ROTATION: 40 degrees at 90 degrees of flexion    PAIN WITH HIP MOTION: no  ----------  Knee Exam  ----------  ALIGNMENT: neutral    RANGE OF MOTION:  Normal (0-120 degrees) with no extensor lag or flexion contracture  LIGAMENTOUS STABILITY:   stable to varus and valgus stress at terminal extension and 30 degrees without any evidence of laxity     STRENGTH:  4/5 knee flexion, extension. 5/5 ankle dorsiflexion and plantarflexion.     PAIN WITH PALPATION: global  KNEE EFFUSION: yes, mild effusion  PAIN WITH KNEE ROM: yes, global  PATELLAR CREPITUS: yes, painful and symptomatic  SPECIAL EXAM FINDINGS:  painful patellar compression    REFLEXES:  PATELLAR 2+/4  ACHILLES 2+/4    CLONUS: no  STRAIGHT LEG TEST:   negative    SENSATION TO LIGHT TOUCH:  DEEP PERONEAL/SUPERFICIAL PERONEAL/SURAL/SAPHENOUS/TIBIAL:   intact    EDEMA:  no  ERYTHEMA:  no  WOUNDS/INCISIONS:  no        Left Lower Extremity Exam:   ----------  Hip Exam  ----------  FLEXION CONTRACTURE: None  FLEXION: 110 degrees  INTERNAL ROTATION: 20 degrees at 90 degrees of flexion   EXTERNAL ROTATION: 40 degrees at 90 degrees of flexion    PAIN WITH HIP MOTION: no  ----------  Knee Exam  ----------  ALIGNMENT: severe valgus, correctable to neutral    RANGE OF MOTION:  Normal (0-120 degrees) with no extensor lag or flexion contracture  LIGAMENTOUS STABILITY:   stable to varus and valgus stress at terminal extension and 30 degrees; retensioning of the LCL is appreciated with varus stress at 30 degrees consistent with lateral compartment degeneration     STRENGTH:  4/5 knee flexion, extension. 5/5 ankle dorsiflexion and plantarflexion.     PAIN WITH PALPATION: global  KNEE EFFUSION: yes, trace effusion  PAIN WITH KNEE ROM:  yes, global  PATELLAR CREPITUS: yes, painful and symptomatic  SPECIAL EXAM FINDINGS:  none    REFLEXES:  PATELLAR 2+/4  ACHILLES 2+/4    CLONUS: no  STRAIGHT LEG TEST:   negative    SENSATION TO LIGHT TOUCH:  DEEP PERONEAL/SUPERFICIAL PERONEAL/SURAL/SAPHENOUS/TIBIAL:   intact    EDEMA:  no  ERYTHEMA:  no  WOUNDS/INCISIONS:  yes, well-healed anteromedial and anterolateral curvilinear incisions      ______________________________________________________________________  ______________________________________________________________________    RADIOGRAPHIC FINDINGS:   Bilateral knee radiographs are personally reviewed.  Right knee radiographs demonstrate moderate medial and lateral compartment osteoarthritis with severe patellofemoral arthritis.  Small periarticular osteophytes visualized in all compartments.  No acute bony injury or fracture.  Incidental enchondroma seen within the distal femoral metaphysis.    Left knee radiographs demonstrate posttraumatic arthritis with genu valgum alignment and bone-on-bone articulation lateral compartment.  There is extensive proximal tibial hardware present with at least 1 broken screw and evidence of potential nonunion versus malunion.    Assessment/Plan:   Diagnosis Plan   1. Primary osteoarthritis of right knee  Large Joint Arthrocentesis: R knee   2. Post-traumatic osteoarthritis of left knee       I discussed with the patient that she has arthritis of both knees.  Given the extensive multiple surgeries she has had on the left knee as well as the possibility of residual infection/osteomyelitis, I do not recommend any intervention for the left knee.  I recommended referral to a traumatologist to discuss revision surgery for the left knee versus hardware removal and evaluation for possible chronic osteomyelitis versus nonunion of the fracture.  I do not believe that she is a candidate for total joint arthroplasty until this work-up is complete on the left side.  In regards to  the right knee, she has moderate to severe tricompartmental osteoarthritis.  She is agreeable to cortisone injection.  She will follow-up as needed.    Procedure Note:  I discussed with the patient the potential benefits of performing a therapeutic injection of the right knee as well as potential risks including but not limited to infection, swelling, pain, bleeding, bruising, nerve/vessel damage, skin color changes, transient elevation in blood glucose levels, and fat atrophy. After informed consent and verifying correct patient, procedure site, and type of procedure, the area was prepped with alcohol, ethyl chloride was used to numb the skin. Via the superior lateral approach, 3cc of 1% lidocaine, 3cc of 0.25% bupivicaine and 2 cc of 40mg/ml of Kenalog were injected into the right knee. The patient tolerated the procedure well. There were no complications. A sterile dressing was placed over the injection site.      Rodney Corrigan MD  11/20/20  10:40 EST

## 2020-11-20 NOTE — PROGRESS NOTES
Procedure   Large Joint Arthrocentesis: R knee  Date/Time: 11/20/2020 10:35 AM  Consent given by: patient  Site marked: site marked  Timeout: Immediately prior to procedure a time out was called to verify the correct patient, procedure, equipment, support staff and site/side marked as required   Supporting Documentation  Indications: pain   Procedure Details  Location: knee - R knee  Preparation: Patient was prepped and draped in the usual sterile fashion  Needle size: 22 G  Approach: anteromedial  Medications administered: 3 mL bupivacaine (PF) 0.25 %; 80 mg triamcinolone acetonide 40 MG/ML; 3 mL lidocaine PF 1% 1 %  Patient tolerance: patient tolerated the procedure well with no immediate complications

## 2020-11-23 DIAGNOSIS — M79.673 PAIN OF FOOT, UNSPECIFIED LATERALITY: Primary | ICD-10-CM

## 2020-11-23 DIAGNOSIS — M17.32 POST-TRAUMATIC OSTEOARTHRITIS OF LEFT KNEE: Primary | ICD-10-CM

## 2020-12-01 DIAGNOSIS — M54.50 LUMBAR BACK PAIN: ICD-10-CM

## 2020-12-01 DIAGNOSIS — M25.50 ARTHRALGIA OF MULTIPLE JOINTS: ICD-10-CM

## 2020-12-01 DIAGNOSIS — M25.561 CHRONIC PAIN OF RIGHT KNEE: ICD-10-CM

## 2020-12-01 DIAGNOSIS — M25.562 CHRONIC PAIN OF LEFT KNEE: ICD-10-CM

## 2020-12-01 DIAGNOSIS — G89.29 CHRONIC PAIN OF LEFT KNEE: ICD-10-CM

## 2020-12-01 DIAGNOSIS — G89.29 CHRONIC PAIN OF RIGHT KNEE: ICD-10-CM

## 2020-12-01 RX ORDER — TIZANIDINE 4 MG/1
4-8 TABLET ORAL NIGHTLY PRN
Qty: 60 TABLET | Refills: 2 | Status: SHIPPED | OUTPATIENT
Start: 2020-12-01 | End: 2021-02-26 | Stop reason: SDUPTHER

## 2020-12-01 RX ORDER — LIDOCAINE 50 MG/G
OINTMENT TOPICAL 2 TIMES DAILY
Qty: 50 G | Refills: 1 | Status: SHIPPED | OUTPATIENT
Start: 2020-12-01 | End: 2021-02-01 | Stop reason: SDUPTHER

## 2020-12-21 DIAGNOSIS — G89.29 CHRONIC PAIN OF LEFT KNEE: ICD-10-CM

## 2020-12-21 DIAGNOSIS — M54.50 LUMBAR BACK PAIN: ICD-10-CM

## 2020-12-21 DIAGNOSIS — M25.561 CHRONIC PAIN OF RIGHT KNEE: ICD-10-CM

## 2020-12-21 DIAGNOSIS — M25.562 CHRONIC PAIN OF LEFT KNEE: ICD-10-CM

## 2020-12-21 DIAGNOSIS — G89.29 CHRONIC PAIN OF RIGHT KNEE: ICD-10-CM

## 2020-12-21 RX ORDER — TRAMADOL HYDROCHLORIDE 50 MG/1
50 TABLET ORAL EVERY 6 HOURS PRN
Qty: 60 TABLET | Refills: 2 | Status: CANCELLED | OUTPATIENT
Start: 2020-12-21

## 2020-12-21 RX ORDER — TRAMADOL HYDROCHLORIDE 50 MG/1
50 TABLET ORAL EVERY 6 HOURS PRN
Qty: 60 TABLET | Refills: 2 | Status: SHIPPED | OUTPATIENT
Start: 2020-12-21 | End: 2021-02-01 | Stop reason: SDUPTHER

## 2020-12-28 RX ORDER — MELOXICAM 15 MG/1
15 TABLET ORAL DAILY
Qty: 30 TABLET | Refills: 2 | Status: SHIPPED | OUTPATIENT
Start: 2020-12-28 | End: 2021-03-25 | Stop reason: SDUPTHER

## 2021-01-06 DIAGNOSIS — F41.9 ANXIETY AND DEPRESSION: ICD-10-CM

## 2021-01-06 DIAGNOSIS — F32.A ANXIETY AND DEPRESSION: ICD-10-CM

## 2021-01-06 RX ORDER — CITALOPRAM 20 MG/1
20 TABLET ORAL DAILY
Qty: 30 TABLET | Refills: 5 | Status: SHIPPED | OUTPATIENT
Start: 2021-01-06 | End: 2021-03-15

## 2021-02-01 ENCOUNTER — TELEPHONE (OUTPATIENT)
Dept: FAMILY MEDICINE CLINIC | Facility: CLINIC | Age: 56
End: 2021-02-01

## 2021-02-01 DIAGNOSIS — M25.561 CHRONIC PAIN OF RIGHT KNEE: ICD-10-CM

## 2021-02-01 DIAGNOSIS — G89.29 CHRONIC PAIN OF LEFT KNEE: ICD-10-CM

## 2021-02-01 DIAGNOSIS — M25.50 ARTHRALGIA OF MULTIPLE JOINTS: ICD-10-CM

## 2021-02-01 DIAGNOSIS — G89.29 CHRONIC PAIN OF RIGHT KNEE: ICD-10-CM

## 2021-02-01 DIAGNOSIS — M54.50 LUMBAR BACK PAIN: ICD-10-CM

## 2021-02-01 DIAGNOSIS — M25.562 CHRONIC PAIN OF LEFT KNEE: ICD-10-CM

## 2021-02-01 RX ORDER — LISINOPRIL AND HYDROCHLOROTHIAZIDE 20; 12.5 MG/1; MG/1
1 TABLET ORAL DAILY
Qty: 90 TABLET | Refills: 0 | Status: SHIPPED | OUTPATIENT
Start: 2021-02-01 | End: 2021-04-29 | Stop reason: SDUPTHER

## 2021-02-01 RX ORDER — TRAMADOL HYDROCHLORIDE 50 MG/1
50 TABLET ORAL EVERY 6 HOURS PRN
Qty: 60 TABLET | Refills: 0 | Status: SHIPPED | OUTPATIENT
Start: 2021-02-01 | End: 2021-02-15 | Stop reason: SDUPTHER

## 2021-02-01 RX ORDER — LIDOCAINE 50 MG/G
OINTMENT TOPICAL 2 TIMES DAILY
Qty: 50 G | Refills: 0 | Status: SHIPPED | OUTPATIENT
Start: 2021-02-01 | End: 2021-03-01

## 2021-02-04 DIAGNOSIS — G89.29 CHRONIC PAIN OF LEFT KNEE: ICD-10-CM

## 2021-02-04 DIAGNOSIS — M25.562 CHRONIC PAIN OF LEFT KNEE: ICD-10-CM

## 2021-02-04 DIAGNOSIS — G62.9 NEUROPATHY: ICD-10-CM

## 2021-02-04 RX ORDER — GABAPENTIN 600 MG/1
600 TABLET ORAL 2 TIMES DAILY
Qty: 60 TABLET | Refills: 0 | Status: SHIPPED | OUTPATIENT
Start: 2021-02-04 | End: 2021-03-03 | Stop reason: SDUPTHER

## 2021-02-15 DIAGNOSIS — M54.50 LUMBAR BACK PAIN: ICD-10-CM

## 2021-02-15 DIAGNOSIS — M25.562 CHRONIC PAIN OF LEFT KNEE: ICD-10-CM

## 2021-02-15 DIAGNOSIS — G89.29 CHRONIC PAIN OF LEFT KNEE: ICD-10-CM

## 2021-02-15 DIAGNOSIS — G89.29 CHRONIC PAIN OF RIGHT KNEE: ICD-10-CM

## 2021-02-15 DIAGNOSIS — M25.561 CHRONIC PAIN OF RIGHT KNEE: ICD-10-CM

## 2021-02-15 RX ORDER — TRAMADOL HYDROCHLORIDE 50 MG/1
50 TABLET ORAL EVERY 6 HOURS PRN
Qty: 60 TABLET | Refills: 0 | Status: CANCELLED | OUTPATIENT
Start: 2021-02-15

## 2021-02-15 RX ORDER — TRAMADOL HYDROCHLORIDE 50 MG/1
50 TABLET ORAL EVERY 6 HOURS PRN
Qty: 60 TABLET | Refills: 0 | Status: SHIPPED | OUTPATIENT
Start: 2021-02-15 | End: 2021-02-28 | Stop reason: SDUPTHER

## 2021-02-26 DIAGNOSIS — M54.50 LUMBAR BACK PAIN: ICD-10-CM

## 2021-02-26 DIAGNOSIS — G89.29 CHRONIC PAIN OF RIGHT KNEE: ICD-10-CM

## 2021-02-26 DIAGNOSIS — M25.561 CHRONIC PAIN OF RIGHT KNEE: ICD-10-CM

## 2021-02-26 DIAGNOSIS — M25.562 CHRONIC PAIN OF LEFT KNEE: ICD-10-CM

## 2021-02-26 DIAGNOSIS — G89.29 CHRONIC PAIN OF LEFT KNEE: ICD-10-CM

## 2021-02-26 RX ORDER — TIZANIDINE 4 MG/1
4-8 TABLET ORAL NIGHTLY PRN
Qty: 60 TABLET | Refills: 2 | Status: SHIPPED | OUTPATIENT
Start: 2021-02-26 | End: 2021-05-21 | Stop reason: SDUPTHER

## 2021-02-28 DIAGNOSIS — G89.29 CHRONIC PAIN OF LEFT KNEE: ICD-10-CM

## 2021-02-28 DIAGNOSIS — G89.29 CHRONIC PAIN OF RIGHT KNEE: ICD-10-CM

## 2021-02-28 DIAGNOSIS — M25.561 CHRONIC PAIN OF RIGHT KNEE: ICD-10-CM

## 2021-02-28 DIAGNOSIS — M54.50 LUMBAR BACK PAIN: ICD-10-CM

## 2021-02-28 DIAGNOSIS — M25.562 CHRONIC PAIN OF LEFT KNEE: ICD-10-CM

## 2021-03-01 DIAGNOSIS — M54.50 LUMBAR BACK PAIN: ICD-10-CM

## 2021-03-01 DIAGNOSIS — M25.562 CHRONIC PAIN OF LEFT KNEE: ICD-10-CM

## 2021-03-01 DIAGNOSIS — M25.561 CHRONIC PAIN OF RIGHT KNEE: ICD-10-CM

## 2021-03-01 DIAGNOSIS — G89.29 CHRONIC PAIN OF LEFT KNEE: ICD-10-CM

## 2021-03-01 DIAGNOSIS — G89.29 CHRONIC PAIN OF RIGHT KNEE: ICD-10-CM

## 2021-03-01 RX ORDER — TRAMADOL HYDROCHLORIDE 50 MG/1
50 TABLET ORAL EVERY 6 HOURS PRN
Qty: 60 TABLET | Refills: 0 | Status: SHIPPED | OUTPATIENT
Start: 2021-03-01 | End: 2021-03-14 | Stop reason: SDUPTHER

## 2021-03-01 RX ORDER — TRAMADOL HYDROCHLORIDE 50 MG/1
50 TABLET ORAL EVERY 6 HOURS PRN
Qty: 60 TABLET | Refills: 0 | Status: CANCELLED | OUTPATIENT
Start: 2021-03-01

## 2021-03-03 DIAGNOSIS — G62.9 NEUROPATHY: ICD-10-CM

## 2021-03-03 DIAGNOSIS — G89.29 CHRONIC PAIN OF LEFT KNEE: ICD-10-CM

## 2021-03-03 DIAGNOSIS — M25.562 CHRONIC PAIN OF LEFT KNEE: ICD-10-CM

## 2021-03-04 DIAGNOSIS — G89.29 CHRONIC PAIN OF LEFT KNEE: ICD-10-CM

## 2021-03-04 DIAGNOSIS — M25.562 CHRONIC PAIN OF LEFT KNEE: ICD-10-CM

## 2021-03-04 DIAGNOSIS — G62.9 NEUROPATHY: ICD-10-CM

## 2021-03-04 RX ORDER — GABAPENTIN 600 MG/1
600 TABLET ORAL 2 TIMES DAILY
Qty: 60 TABLET | Refills: 0 | Status: CANCELLED | OUTPATIENT
Start: 2021-03-04

## 2021-03-04 RX ORDER — GABAPENTIN 600 MG/1
600 TABLET ORAL 2 TIMES DAILY
Qty: 60 TABLET | Refills: 0 | Status: SHIPPED | OUTPATIENT
Start: 2021-03-04 | End: 2021-03-19 | Stop reason: SDUPTHER

## 2021-03-08 ENCOUNTER — TELEMEDICINE (OUTPATIENT)
Dept: FAMILY MEDICINE CLINIC | Facility: CLINIC | Age: 56
End: 2021-03-08

## 2021-03-08 DIAGNOSIS — G62.9 NEUROPATHY: Primary | ICD-10-CM

## 2021-03-08 DIAGNOSIS — M54.50 LUMBAR BACK PAIN: ICD-10-CM

## 2021-03-08 PROCEDURE — 99213 OFFICE O/P EST LOW 20 MIN: CPT | Performed by: FAMILY MEDICINE

## 2021-03-08 NOTE — PROGRESS NOTES
This was an audio and video enabled telemedicine encounter.     You have chosen to receive care through a telehealth visit.  Do you consent to use a video/audio connection for your medical care today? Yes     Time spent: 8 min total with patient in discussion  .  Assessment/Plan       Problems Addressed this Visit     None      Visit Diagnoses     Neuropathy    -  Primary    Lumbar back pain          Diagnoses       Codes Comments    Neuropathy    -  Primary ICD-10-CM: G62.9  ICD-9-CM: 355.9     Lumbar back pain     ICD-10-CM: M54.5  ICD-9-CM: 724.2             Follow up: Return if symptoms worsen or fail to improve.     DISCUSSION  Chronic pain due to lumbar disc disease and sciatica.  Also with chronic arthritis.    Continue tramadol 50 mg every 6 hours and gabapentin 600 mg.  We will try at 3 times a day given increased pain.    She will call in 2 weeks with update.          MEDICATIONS PRESCRIBED  Requested Prescriptions      No prescriptions requested or ordered in this encounter            Cristiano dated on 3/8/2021   was reviewed and appropriate.        -------------------------------------------    Subjective     No chief complaint on file.        History of Present Illness    Chronic knee and hip pain   Numbness in the hip  Getting worse    To see Dr Corrigan and made an appt for 3/11/2021    Need knee surgery    Pain med helps and gabapentin    Tries to stay off of it     Trying to make arrangements to move due to costs  On waiting lists    Set up for disabled persons better    No vehicle now  Dtr to take her to Dr Corrigan's office for knee shot    Last shot helped even in other joints      ===================  Anxiety and depression  No panic attack in awhile  May see a psychiatrist    =============  HTN  Does not check BP  No headaches  No chest pain and no shortness of breath          Social History     Tobacco Use   Smoking Status Never Smoker   Smokeless Tobacco Never Used          Past Medical  History,Medications, Allergies, and social history was reviewed.          Review of Systems   Constitutional: Negative.    HENT: Negative.    Respiratory: Negative.    Cardiovascular: Negative.    Gastrointestinal: Negative.    Psychiatric/Behavioral: Negative.        Objective     Unable to do vital signs since video visit      Physical Exam   Constitutional: She appears well-developed and well-nourished. No distress.   HENT:   Head: Normocephalic.   Pulmonary/Chest: Effort normal.   Neurological: She is alert.   Psychiatric: She has a normal mood and affect.          Unable to do full physical examination due to video visit            Dl Lucas MD

## 2021-03-14 DIAGNOSIS — G89.29 CHRONIC PAIN OF LEFT KNEE: ICD-10-CM

## 2021-03-14 DIAGNOSIS — M25.562 CHRONIC PAIN OF LEFT KNEE: ICD-10-CM

## 2021-03-14 DIAGNOSIS — M25.561 CHRONIC PAIN OF RIGHT KNEE: ICD-10-CM

## 2021-03-14 DIAGNOSIS — M54.50 LUMBAR BACK PAIN: ICD-10-CM

## 2021-03-14 DIAGNOSIS — G89.29 CHRONIC PAIN OF RIGHT KNEE: ICD-10-CM

## 2021-03-15 DIAGNOSIS — G89.29 CHRONIC PAIN OF RIGHT KNEE: ICD-10-CM

## 2021-03-15 DIAGNOSIS — M54.50 LUMBAR BACK PAIN: ICD-10-CM

## 2021-03-15 DIAGNOSIS — G89.29 CHRONIC PAIN OF LEFT KNEE: ICD-10-CM

## 2021-03-15 DIAGNOSIS — M25.561 CHRONIC PAIN OF RIGHT KNEE: ICD-10-CM

## 2021-03-15 DIAGNOSIS — M25.562 CHRONIC PAIN OF LEFT KNEE: ICD-10-CM

## 2021-03-15 RX ORDER — TRAMADOL HYDROCHLORIDE 50 MG/1
50 TABLET ORAL EVERY 6 HOURS PRN
Qty: 60 TABLET | Refills: 0 | Status: CANCELLED | OUTPATIENT
Start: 2021-03-15

## 2021-03-15 RX ORDER — TRAMADOL HYDROCHLORIDE 50 MG/1
50 TABLET ORAL EVERY 6 HOURS PRN
Qty: 60 TABLET | Refills: 3 | Status: SHIPPED | OUTPATIENT
Start: 2021-03-15 | End: 2021-05-10 | Stop reason: SDUPTHER

## 2021-03-16 ENCOUNTER — OFFICE VISIT (OUTPATIENT)
Dept: ORTHOPEDIC SURGERY | Facility: CLINIC | Age: 56
End: 2021-03-16

## 2021-03-16 VITALS
SYSTOLIC BLOOD PRESSURE: 146 MMHG | HEART RATE: 88 BPM | WEIGHT: 244.2 LBS | HEIGHT: 66 IN | DIASTOLIC BLOOD PRESSURE: 90 MMHG | BODY MASS INDEX: 39.25 KG/M2

## 2021-03-16 DIAGNOSIS — M16.52 POST-TRAUMATIC OSTEOARTHRITIS OF LEFT HIP: Primary | ICD-10-CM

## 2021-03-16 DIAGNOSIS — M17.11 PRIMARY OSTEOARTHRITIS OF RIGHT KNEE: ICD-10-CM

## 2021-03-16 DIAGNOSIS — E66.9 CLASS 2 OBESITY WITHOUT SERIOUS COMORBIDITY WITH BODY MASS INDEX (BMI) OF 39.0 TO 39.9 IN ADULT, UNSPECIFIED OBESITY TYPE: ICD-10-CM

## 2021-03-16 PROCEDURE — 20610 DRAIN/INJ JOINT/BURSA W/O US: CPT | Performed by: ORTHOPAEDIC SURGERY

## 2021-03-16 PROCEDURE — 99214 OFFICE O/P EST MOD 30 MIN: CPT | Performed by: ORTHOPAEDIC SURGERY

## 2021-03-16 RX ORDER — TRIAMCINOLONE ACETONIDE 40 MG/ML
80 INJECTION, SUSPENSION INTRA-ARTICULAR; INTRAMUSCULAR
Status: COMPLETED | OUTPATIENT
Start: 2021-03-16 | End: 2021-03-16

## 2021-03-16 RX ORDER — LIDOCAINE HYDROCHLORIDE 10 MG/ML
3 INJECTION, SOLUTION EPIDURAL; INFILTRATION; INTRACAUDAL; PERINEURAL
Status: COMPLETED | OUTPATIENT
Start: 2021-03-16 | End: 2021-03-16

## 2021-03-16 RX ORDER — BUPIVACAINE HYDROCHLORIDE 2.5 MG/ML
3 INJECTION, SOLUTION EPIDURAL; INFILTRATION; INTRACAUDAL
Status: COMPLETED | OUTPATIENT
Start: 2021-03-16 | End: 2021-03-16

## 2021-03-16 RX ADMIN — LIDOCAINE HYDROCHLORIDE 3 ML: 10 INJECTION, SOLUTION EPIDURAL; INFILTRATION; INTRACAUDAL; PERINEURAL at 14:33

## 2021-03-16 RX ADMIN — BUPIVACAINE HYDROCHLORIDE 3 ML: 2.5 INJECTION, SOLUTION EPIDURAL; INFILTRATION; INTRACAUDAL at 14:33

## 2021-03-16 RX ADMIN — TRIAMCINOLONE ACETONIDE 80 MG: 40 INJECTION, SUSPENSION INTRA-ARTICULAR; INTRAMUSCULAR at 14:33

## 2021-03-16 NOTE — PROGRESS NOTES
"Orthopaedic Clinic Note: Knee Established Patient    Chief Complaint   Patient presents with   • Follow-up     4 months follow up Primary osteoarthritis of right knee         HPI    It has been {Numbers; 0-30:05640}  {DAYS, WEEKS, MONTHS, YEARS:} since Ms. Machado's last visit. She returns to clinic today for ***. She rates her pain a {0-10:95712}/10 on the pain scale and is currently taking {Meds Pt is Takin} for pain. She is ambulating with {Ambulating Devices:23613}. She {completed/continuin} {therapy/home exercise program:82843}.  She {denies/admits drainage:72542}.  Overall, she is doing {better worse same:90142}. ***    I have reviewed the following portions of the patient's history:{Shekhar HPI:29201::\"History of Present Illness\"}    Past Medical History:   Diagnosis Date   • Arthritis       Past Surgical History:   Procedure Laterality Date   • HIP SURGERY Left    • KNEE SURGERY Left       Family History   Problem Relation Age of Onset   • Lung cancer Mother    • Lung cancer Father    • Hypertension Sister      Social History     Socioeconomic History   • Marital status: Single     Spouse name: Not on file   • Number of children: Not on file   • Years of education: Not on file   • Highest education level: Not on file   Tobacco Use   • Smoking status: Never Smoker   • Smokeless tobacco: Never Used   Substance and Sexual Activity   • Alcohol use: Yes     Comment: occasional    • Drug use: Never   • Sexual activity: Defer      Current Outpatient Medications on File Prior to Visit   Medication Sig Dispense Refill   • gabapentin (NEURONTIN) 600 MG tablet Take 1 tablet by mouth 2 (Two) Times a Day. 60 tablet 0   • lisinopril-hydrochlorothiazide (PRINZIDE,ZESTORETIC) 20-12.5 MG per tablet Take 1 tablet by mouth Daily. 90 tablet 0   • meloxicam (MOBIC) 15 MG tablet Take 1 tablet by mouth Daily. 30 tablet 2   • tiZANidine (ZANAFLEX) 4 MG tablet Take 1-2 tablets by mouth At Night As Needed for " "Muscle Spasms. 60 tablet 2   • traMADol (ULTRAM) 50 MG tablet Take 1 tablet by mouth Every 6 (Six) Hours As Needed for Moderate Pain . 60 tablet 3     No current facility-administered medications on file prior to visit.      No Known Allergies     Review of Systems   Constitutional: Negative.    HENT: Negative.    Eyes: Negative.    Respiratory: Negative.    Cardiovascular: Negative.    Gastrointestinal: Negative.    Endocrine: Negative.    Genitourinary: Negative.    Musculoskeletal: Positive for arthralgias.   Skin: Negative.    Allergic/Immunologic: Negative.    Neurological: Negative.    Hematological: Negative.    Psychiatric/Behavioral: Negative.         The patient's Review of Systems was personally reviewed and confirmed as accurate.    Physical Exam  Blood pressure 146/90, pulse 88, height 167.6 cm (65.98\"), weight 111 kg (244 lb 3.2 oz).    Body mass index is 39.43 kg/m².    GENERAL APPEARANCE: {General Appearance:80848::\"awake, alert, oriented, in no acute distress\",\"well developed, well nourished\"}  LUNGS:  breathing nonlabored  EXTREMITIES: no clubbing, cyanosis  PERIPHERAL PULSES: palpable dorsalis pedis and posterior tibial pulses bilaterally.    GAIT:  {Gait:47884}        ----------  {RIGHT LEFT BILATERAL:57650} Knee Exam:  ----------  ALIGNMENT: {Neutral/varus:24011}  ----------  RANGE OF MOTION:  {Shehkar Knee ROM:76116}  LIGAMENTOUS STABILITY:   {Shekhar Stable:86163}  ----------  STRENGTH:  KNEE FLEXION {0-5:69096::\"5\"}/5  KNEE EXTENSION  {0-5:45696::\"5\"}/5  ANKLE DORSIFLEXION  {0-5:14201::\"5\"}/5  ANKLE PLANTARFLEXION  {0-5:87039::\"5\"}/5  ----------  PAIN WITH PALPATION:{Knee pain location:08478}  KNEE EFFUSION: {Yes/No:75785::\"no\"}  PAIN WITH KNEE ROM: {YES NO:42729}  PATELLAR CREPITUS:  {knee crepitus:00405::\"no\"}  ----------  SENSATION TO LIGHT TOUCH:  DEEP PERONEAL/SUPERFICIAL PERONEAL/SURAL/SAPHENOUS/TIBIAL:    {Sensation:85854::\"intact\"}  ----------  EDEMA:  {Yes/No:19218::\"no\"}  ERYTHEMA:  " "  {Yes/No:99186::\"no\"}  WOUNDS/INCISIONS:   {Yes/No:89375::\"no\"}  _____________________________________________________________________  _____________________________________________________________________    RADIOGRAPHIC FINDINGS:   Indication: ***    Comparison: {Shekhar XR comparison:28673}    Knee films: {Shekhar knee XR findings:97826}    Assessment/Plan:  No diagnosis found.  ***      Jennifer Ortega MA  03/16/21  13:41 EDT  "

## 2021-03-16 NOTE — PROGRESS NOTES
Procedure   Large Joint Arthrocentesis: R knee  Date/Time: 3/16/2021 2:33 PM  Consent given by: patient  Site marked: site marked  Timeout: Immediately prior to procedure a time out was called to verify the correct patient, procedure, equipment, support staff and site/side marked as required   Supporting Documentation  Indications: pain   Procedure Details  Location: knee - R knee  Preparation: Patient was prepped and draped in the usual sterile fashion  Needle size: 23 G  Approach: anterolateral  Medications administered: 3 mL bupivacaine (PF) 0.25 %; 3 mL lidocaine PF 1% 1 %; 80 mg triamcinolone acetonide 40 MG/ML  Patient tolerance: patient tolerated the procedure well with no immediate complications

## 2021-03-16 NOTE — PROGRESS NOTES
Orthopaedic Clinic Note: Hip New Patient    Chief Complaint   Patient presents with   • Left Hip - Pain   Follow-up right knee pain    HPI    Jordyn Machado is a 55 y.o. female who presents with left hip pain for 6 month(s). Onset atraumatic and gradual in nature. Pain is localized to groin and lateral trochanter and is a 8/10 on the pain scale.Pain is described as dull, aching, burning, throbbing, stabbing and shooting.  She states that it primarily occurs when she is standing up and she has associated numbness and tingling down the leg.  She states that it starts in the groin and works distally.  Associated symptoms include pain, swelling and stiffness.  The pain is worse with walking, standing, sitting, climbing stairs and sleeping; pain medication and/or NSAID improve the pain. Previous treatments have included: cane/walker and NSAIDS since symptom onset. Although some transient relief was reported with these interventions, these conservative measures have failed and symptoms have persisted. The patient is limited in daily activities and has had a significant decrease in quality of life as a result. She denies fevers, chills, or constitutional symptoms.    She is also here for follow-up of her right knee pain.  She previously underwent cortisone injection the right knee that provided excellent relief.  She continues to have swelling and stiffness in the knee and pain is worse with walking weightbearing activities.  Overall she is doing worse in regards to the right knee.    I have reviewed the following portions of the patient's history:History of Present Illness    Past Medical History:   Diagnosis Date   • Arthritis       Past Surgical History:   Procedure Laterality Date   • HIP SURGERY Left 2004   • KNEE SURGERY Left       Family History   Problem Relation Age of Onset   • Lung cancer Mother    • Lung cancer Father    • Hypertension Sister      Social History     Socioeconomic History   • Marital  "status: Single     Spouse name: Not on file   • Number of children: Not on file   • Years of education: Not on file   • Highest education level: Not on file   Tobacco Use   • Smoking status: Never Smoker   • Smokeless tobacco: Never Used   Substance and Sexual Activity   • Alcohol use: Yes     Comment: occasional    • Drug use: Never   • Sexual activity: Defer      Current Outpatient Medications on File Prior to Visit   Medication Sig Dispense Refill   • gabapentin (NEURONTIN) 600 MG tablet Take 1 tablet by mouth 2 (Two) Times a Day. 60 tablet 0   • lisinopril-hydrochlorothiazide (PRINZIDE,ZESTORETIC) 20-12.5 MG per tablet Take 1 tablet by mouth Daily. 90 tablet 0   • meloxicam (MOBIC) 15 MG tablet Take 1 tablet by mouth Daily. 30 tablet 2   • tiZANidine (ZANAFLEX) 4 MG tablet Take 1-2 tablets by mouth At Night As Needed for Muscle Spasms. 60 tablet 2   • traMADol (ULTRAM) 50 MG tablet Take 1 tablet by mouth Every 6 (Six) Hours As Needed for Moderate Pain . 60 tablet 3     No current facility-administered medications on file prior to visit.      No Known Allergies     Review of Systems   Constitutional: Negative.    HENT: Negative.    Eyes: Negative.    Respiratory: Negative.    Cardiovascular: Negative.    Gastrointestinal: Negative.    Endocrine: Negative.    Genitourinary: Negative.    Musculoskeletal: Positive for arthralgias.   Skin: Negative.    Allergic/Immunologic: Negative.    Neurological: Negative.    Hematological: Negative.    Psychiatric/Behavioral: Negative.         The patient's Review of Systems was personally reviewed and confirmed as accurate.    The following portions of the patient's history were reviewed and updated as appropriate: allergies, current medications, past family history, past medical history, past social history, past surgical history and problem list.    Physical Exam  Blood pressure 146/90, pulse 88, height 167.6 cm (65.98\"), weight 111 kg (244 lb 3.2 oz).    Body mass index is " 39.43 kg/m².    GENERAL APPEARANCE: awake, alert & oriented x 3, in no acute distress, well developed, well nourished and obese  PSYCH: normal affect  LUNGS:  breathing nonlabored  EYES: sclera anicteric  CARDIOVASCULAR: palpable dorsalis pedis, palpable posterior tibial bilaterally. Capillary refill less than 2 seconds  EXTREMITIES: no clubbing, cyanosis  GAIT:  Antalgic           Right knee exam:  ----------  Hip Exam  ----------  FLEXION CONTRACTURE: None  FLEXION: 110 degrees  INTERNAL ROTATION: 20 degrees at 90 degrees of flexion   EXTERNAL ROTATION: 40 degrees at 90 degrees of flexion    PAIN WITH HIP MOTION: no  ----------  Knee Exam  ----------  ALIGNMENT: neutral, no varus or valgus deformity     RANGE OF MOTION:  Normal (0-120 degrees) with no extensor lag or flexion contracture  LIGAMENTOUS STABILITY:   stable to varus and valgus stress at terminal extension and 30 degrees without any evidence of laxity     STRENGTH:  5/5 knee flexion, extension. 5/5 ankle dorsiflexion and plantarflexion.     PAIN WITH PALPATION: Global   KNEE EFFUSION: Yes, mild  PAIN WITH KNEE ROM: Yes, anteriorly  PATELLAR CREPITUS: yes, painful and symptomatic  SPECIAL EXAM FINDINGS:  Negative patellar compression    REFLEXES:  PATELLAR 2+/4  ACHILLES 2+/4    CLONUS: negative  STRAIGHT LEG TEST:   negative    SENSATION TO LIGHT TOUCH:  DEEP PERONEAL/SUPERFICIAL PERONEAL/SURAL/SAPHENOUS/TIBIAL:   intact    EDEMA:  no  ERYTHEMA:  no  WOUNDS/INCISIONS: no overlying skin problems.      Left Hip Exam:   RANGE OF MOTION:   FLEXION CONTRACTURE: None   FLEXION: 110 degrees   INTERNAL ROTATION: 20 degrees at 90 degrees of flexion   EXTERNAL ROTATION: 40 degrees at 90 degrees of flexion    PAIN WITH HIP MOTION: Yes, slight pain with terminal internal rotation of hip  PAIN WITH LOGROLL: no  STINCHFIELD TEST: negative    KNEE EXAM: full knee ROM (0-120 degrees), stable to varus and valgus stress at terminal extension and 30 degrees flexion      STRENGTH:  5/5 hip adduction, abduction, flexion. 5/5 strength knee flexion, extension. 5/5 strength ankle dorsiflexion and plantarflexion.     GREATER TROCHANTER BURSAL PAIN:  no     REFLEXES:   PATELLAR 2+/4   ACHILLES 2+/4    CLONUS: negative  STRAIGHT LEG TEST:   negative    SENSATION TO LIGHT TOUCH:  DEEP PERONEAL/SUPERFICIAL PERONEAL/SURAL/SAPHENOUS/TIBIAL:  intact    EDEMA:   no  ERYTHEMA:  no  WOUNDS/INCISIONS: no overlying skin problems.      ------------------------------------------------------------------    LEG LENGTHS:  equal  _____________________________________________________  _____________________________________________________    RADIOGRAPHIC FINDINGS:   Pelvis and left hip radiographs from 11/5/2020 were personally reviewed.  Radiographs demonstrate the following: Right: mild joint space narrowing, minimal osteophyte formation; Left: Mild to moderate posttraumatic arthritic changes with periarticular osteophytes visualized at the margin of the femoral head neck junction.  There is previous retained hardware cannulated screws in the proximal femur.  No evidence of penetration into the hip joint.  Femoral head does appear to be concentric with no evidence of avascular necrosis.    Assessment/Plan:   Diagnosis Plan   1. Post-traumatic osteoarthritis of left hip  [place] Guide For Pain Meds Inj   2. Primary osteoarthritis of right knee     3. Class 2 obesity without serious comorbidity with body mass index (BMI) of 39.0 to 39.9 in adult, unspecified obesity type         Regards to patient's left hip, she does have some signs and symptoms of posttraumatic arthritis.  However, her hip range of motion is well-preserved and not overly symptomatic on exam today.  Given her history and concomitant numbness and tingling, I am somewhat suspicious as to whether her hip is the source of her pain.  I recommended intra-articular cortisone injection of the left hip to see how much of her symptoms improve.  She  is agreeable to this.    In regards to the patient's knee, the patient has failed conservative treatment measures and is a candidate for joint arthroplasty.  I discussed the joint arthroplasty surgical process as well as the recovery and rehabilitation time frame.  The patient asked several questions regarding the joint arthroplasty surgery, which were answered accordingly.  Ultimately, the patient declines surgical intervention at this time and wishes to continue with conservative treatment measures.  Alternative conservative treatment measures were discussed including bracing, therapy, topical/oral anti-inflammatories, activity modification, and weight loss.  The patient considered these treatment options and wishes to proceed with corticosteroid injection(s) today.  Therefore we will proceed with corticosteroid injection(s) today.    Follow-up as needed.    Patient has an elevated BMI. The patient has been instructed on various weight loss avenues including diet, portion control, calorie restriction, low/no impact exercise, referral to weight loss management and/or bariatric surgery.  It was explained that weight loss can improve joint pain alone by decreasing the joint reaction forces.  For every pound of weight change, the knee and hip joints see a 4 to 5 fold change in pressure.  Given these options, the patient will proceed with diet and low impact exercise.    Procedure Note:  I discussed with the patient the potential benefits of performing a therapeutic injection of the right knee as well as potential risks including but not limited to infection, swelling, pain, bleeding, bruising, nerve/vessel damage, skin color changes, transient elevation in blood glucose levels, and fat atrophy. After informed consent and verifying correct patient, procedure site, and type of procedure, the area was prepped with alcohol, ethyl chloride was used to numb the skin. Via the superior lateral approach, 3cc of 1% lidocaine,  3cc of 0.25% bupivicaine and 2 cc of 40mg/ml of Kenalog were injected into the right knee. The patient tolerated the procedure well. There were no complications. A sterile dressing was placed over the injection site.      Rodney Corrigan MD  03/16/21  14:35 EDT

## 2021-03-19 DIAGNOSIS — G89.29 CHRONIC PAIN OF LEFT KNEE: ICD-10-CM

## 2021-03-19 DIAGNOSIS — G62.9 NEUROPATHY: ICD-10-CM

## 2021-03-19 DIAGNOSIS — M25.562 CHRONIC PAIN OF LEFT KNEE: ICD-10-CM

## 2021-03-19 RX ORDER — GABAPENTIN 600 MG/1
600 TABLET ORAL 3 TIMES DAILY
Qty: 90 TABLET | Refills: 0 | Status: SHIPPED | OUTPATIENT
Start: 2021-03-19 | End: 2021-04-19 | Stop reason: SDUPTHER

## 2021-03-19 RX ORDER — GABAPENTIN 600 MG/1
600 TABLET ORAL 2 TIMES DAILY
Qty: 60 TABLET | Refills: 0 | Status: CANCELLED | OUTPATIENT
Start: 2021-03-19

## 2021-03-22 ENCOUNTER — APPOINTMENT (OUTPATIENT)
Dept: GENERAL RADIOLOGY | Facility: HOSPITAL | Age: 56
End: 2021-03-22

## 2021-03-26 RX ORDER — MELOXICAM 15 MG/1
15 TABLET ORAL DAILY
Qty: 30 TABLET | Refills: 2 | Status: SHIPPED | OUTPATIENT
Start: 2021-03-26 | End: 2021-06-18

## 2021-03-29 DIAGNOSIS — F41.9 ANXIETY: Primary | ICD-10-CM

## 2021-04-02 ENCOUNTER — HOSPITAL ENCOUNTER (OUTPATIENT)
Dept: GENERAL RADIOLOGY | Facility: HOSPITAL | Age: 56
Discharge: HOME | End: 2021-04-02
Admitting: ORTHOPAEDIC SURGERY

## 2021-04-02 DIAGNOSIS — M16.52 POST-TRAUMATIC OSTEOARTHRITIS OF LEFT HIP: ICD-10-CM

## 2021-04-02 PROCEDURE — 25010000002 TRIAMCINOLONE PER 10 MG: Performed by: ORTHOPAEDIC SURGERY

## 2021-04-02 PROCEDURE — 25010000002 IOPAMIDOL 61 % SOLUTION: Performed by: ORTHOPAEDIC SURGERY

## 2021-04-02 PROCEDURE — 77002 NEEDLE LOCALIZATION BY XRAY: CPT

## 2021-04-02 RX ORDER — LIDOCAINE HYDROCHLORIDE 10 MG/ML
5 INJECTION, SOLUTION EPIDURAL; INFILTRATION; INTRACAUDAL; PERINEURAL ONCE
Status: COMPLETED | OUTPATIENT
Start: 2021-04-02 | End: 2021-04-02

## 2021-04-02 RX ORDER — TRIAMCINOLONE ACETONIDE 40 MG/ML
80 INJECTION, SUSPENSION INTRA-ARTICULAR; INTRAMUSCULAR ONCE
Status: COMPLETED | OUTPATIENT
Start: 2021-04-02 | End: 2021-04-02

## 2021-04-02 RX ORDER — BUPIVACAINE HYDROCHLORIDE 2.5 MG/ML
3 INJECTION, SOLUTION EPIDURAL; INFILTRATION; INTRACAUDAL ONCE
Status: COMPLETED | OUTPATIENT
Start: 2021-04-02 | End: 2021-04-02

## 2021-04-02 RX ORDER — LIDOCAINE HYDROCHLORIDE 10 MG/ML
3 INJECTION, SOLUTION EPIDURAL; INFILTRATION; INTRACAUDAL; PERINEURAL ONCE
Status: COMPLETED | OUTPATIENT
Start: 2021-04-02 | End: 2021-04-02

## 2021-04-02 RX ADMIN — LIDOCAINE HYDROCHLORIDE 5 ML: 10 INJECTION, SOLUTION EPIDURAL; INFILTRATION; INTRACAUDAL; PERINEURAL at 14:49

## 2021-04-02 RX ADMIN — IOPAMIDOL 20 ML: 612 INJECTION, SOLUTION INTRAVENOUS at 14:49

## 2021-04-02 RX ADMIN — LIDOCAINE HYDROCHLORIDE 3 ML: 10 INJECTION, SOLUTION EPIDURAL; INFILTRATION; INTRACAUDAL; PERINEURAL at 14:49

## 2021-04-02 RX ADMIN — BUPIVACAINE HYDROCHLORIDE 3 ML: 2.5 INJECTION, SOLUTION EPIDURAL; INFILTRATION; INTRACAUDAL; PERINEURAL at 14:48

## 2021-04-02 RX ADMIN — TRIAMCINOLONE ACETONIDE 80 MG: 40 INJECTION, SUSPENSION INTRA-ARTICULAR; INTRAMUSCULAR at 14:49

## 2021-04-19 DIAGNOSIS — G89.29 CHRONIC PAIN OF LEFT KNEE: ICD-10-CM

## 2021-04-19 DIAGNOSIS — M25.562 CHRONIC PAIN OF LEFT KNEE: ICD-10-CM

## 2021-04-19 DIAGNOSIS — G62.9 NEUROPATHY: ICD-10-CM

## 2021-04-20 RX ORDER — GABAPENTIN 600 MG/1
600 TABLET ORAL 3 TIMES DAILY
Qty: 90 TABLET | Refills: 1 | Status: SHIPPED | OUTPATIENT
Start: 2021-04-20 | End: 2021-06-14 | Stop reason: SDUPTHER

## 2021-04-30 RX ORDER — LISINOPRIL AND HYDROCHLOROTHIAZIDE 20; 12.5 MG/1; MG/1
1 TABLET ORAL DAILY
Qty: 90 TABLET | Refills: 0 | Status: SHIPPED | OUTPATIENT
Start: 2021-04-30 | End: 2021-07-29 | Stop reason: SDUPTHER

## 2021-05-10 DIAGNOSIS — M25.562 CHRONIC PAIN OF LEFT KNEE: ICD-10-CM

## 2021-05-10 DIAGNOSIS — M54.50 LUMBAR BACK PAIN: ICD-10-CM

## 2021-05-10 DIAGNOSIS — M25.561 CHRONIC PAIN OF RIGHT KNEE: ICD-10-CM

## 2021-05-10 DIAGNOSIS — G89.29 CHRONIC PAIN OF LEFT KNEE: ICD-10-CM

## 2021-05-10 DIAGNOSIS — G89.29 CHRONIC PAIN OF RIGHT KNEE: ICD-10-CM

## 2021-05-11 RX ORDER — TRAMADOL HYDROCHLORIDE 50 MG/1
50 TABLET ORAL EVERY 6 HOURS PRN
Qty: 60 TABLET | Refills: 1 | Status: SHIPPED | OUTPATIENT
Start: 2021-05-11 | End: 2021-06-07 | Stop reason: SDUPTHER

## 2021-05-21 DIAGNOSIS — M25.562 CHRONIC PAIN OF LEFT KNEE: ICD-10-CM

## 2021-05-21 DIAGNOSIS — G89.29 CHRONIC PAIN OF RIGHT KNEE: ICD-10-CM

## 2021-05-21 DIAGNOSIS — M25.561 CHRONIC PAIN OF RIGHT KNEE: ICD-10-CM

## 2021-05-21 DIAGNOSIS — M54.50 LUMBAR BACK PAIN: ICD-10-CM

## 2021-05-21 DIAGNOSIS — G89.29 CHRONIC PAIN OF LEFT KNEE: ICD-10-CM

## 2021-05-21 RX ORDER — TIZANIDINE 4 MG/1
4-8 TABLET ORAL NIGHTLY PRN
Qty: 60 TABLET | Refills: 2 | Status: SHIPPED | OUTPATIENT
Start: 2021-05-21 | End: 2021-08-23 | Stop reason: SDUPTHER

## 2021-06-07 ENCOUNTER — TELEPHONE (OUTPATIENT)
Dept: FAMILY MEDICINE CLINIC | Facility: CLINIC | Age: 56
End: 2021-06-07

## 2021-06-07 DIAGNOSIS — M25.561 CHRONIC PAIN OF RIGHT KNEE: ICD-10-CM

## 2021-06-07 DIAGNOSIS — G89.29 CHRONIC PAIN OF LEFT KNEE: ICD-10-CM

## 2021-06-07 DIAGNOSIS — M54.50 LUMBAR BACK PAIN: ICD-10-CM

## 2021-06-07 DIAGNOSIS — G89.29 CHRONIC PAIN OF RIGHT KNEE: ICD-10-CM

## 2021-06-07 DIAGNOSIS — M25.562 CHRONIC PAIN OF LEFT KNEE: ICD-10-CM

## 2021-06-07 RX ORDER — TRAMADOL HYDROCHLORIDE 50 MG/1
50 TABLET ORAL EVERY 6 HOURS PRN
Qty: 60 TABLET | Refills: 1 | Status: SHIPPED | OUTPATIENT
Start: 2021-06-07 | End: 2021-07-06 | Stop reason: SDUPTHER

## 2021-06-14 DIAGNOSIS — G62.9 NEUROPATHY: ICD-10-CM

## 2021-06-14 DIAGNOSIS — G89.29 CHRONIC PAIN OF LEFT KNEE: ICD-10-CM

## 2021-06-14 DIAGNOSIS — M25.562 CHRONIC PAIN OF LEFT KNEE: ICD-10-CM

## 2021-06-15 RX ORDER — GABAPENTIN 600 MG/1
600 TABLET ORAL 3 TIMES DAILY
Qty: 90 TABLET | Refills: 0 | Status: SHIPPED | OUTPATIENT
Start: 2021-06-15 | End: 2021-07-12 | Stop reason: SDUPTHER

## 2021-06-18 RX ORDER — MELOXICAM 15 MG/1
TABLET ORAL
Qty: 30 TABLET | Refills: 2 | Status: SHIPPED | OUTPATIENT
Start: 2021-06-18 | End: 2021-09-20

## 2021-07-06 DIAGNOSIS — M54.50 LUMBAR BACK PAIN: ICD-10-CM

## 2021-07-06 DIAGNOSIS — G89.29 CHRONIC PAIN OF RIGHT KNEE: ICD-10-CM

## 2021-07-06 DIAGNOSIS — G89.29 CHRONIC PAIN OF LEFT KNEE: ICD-10-CM

## 2021-07-06 DIAGNOSIS — M25.562 CHRONIC PAIN OF LEFT KNEE: ICD-10-CM

## 2021-07-06 DIAGNOSIS — M25.561 CHRONIC PAIN OF RIGHT KNEE: ICD-10-CM

## 2021-07-06 RX ORDER — TRAMADOL HYDROCHLORIDE 50 MG/1
50 TABLET ORAL EVERY 6 HOURS PRN
Qty: 60 TABLET | Refills: 1 | Status: CANCELLED | OUTPATIENT
Start: 2021-07-06

## 2021-07-06 RX ORDER — TRAMADOL HYDROCHLORIDE 50 MG/1
50 TABLET ORAL EVERY 6 HOURS PRN
Qty: 60 TABLET | Refills: 0 | Status: SHIPPED | OUTPATIENT
Start: 2021-07-06 | End: 2021-07-19 | Stop reason: SDUPTHER

## 2021-07-06 NOTE — TELEPHONE ENCOUNTER
Caller: Jordyn Machado    Relationship: Self    Best call back number: [phone]    Medication needed:   Requested Prescriptions     Pending Prescriptions Disp Refills   • traMADol (ULTRAM) 50 MG tablet 60 tablet 1     Sig: Take 1 tablet by mouth Every 6 (Six) Hours As Needed for Moderate Pain .       When do you need the refill by: ASAP    What additional details did the patient provide when requesting the medication: PATIENT IS REQUESTING A REFILL ON ABOVE MEDICATION.   PATIENT HAS UPCOMING APPOINTMENT ON 7/22/21    Does the patient have less than a 3 day supply:  [x] Yes  [] No    What is the patient's preferred pharmacy: [facility] [address] [place] [address] [phone] [facility] [phone]

## 2021-07-12 DIAGNOSIS — M25.562 CHRONIC PAIN OF LEFT KNEE: ICD-10-CM

## 2021-07-12 DIAGNOSIS — G89.29 CHRONIC PAIN OF LEFT KNEE: ICD-10-CM

## 2021-07-12 DIAGNOSIS — G62.9 NEUROPATHY: ICD-10-CM

## 2021-07-12 RX ORDER — GABAPENTIN 600 MG/1
600 TABLET ORAL 3 TIMES DAILY
Qty: 90 TABLET | Refills: 1 | Status: SHIPPED | OUTPATIENT
Start: 2021-07-12 | End: 2021-09-06 | Stop reason: SDUPTHER

## 2021-07-19 ENCOUNTER — TELEPHONE (OUTPATIENT)
Dept: ORTHOPEDIC SURGERY | Facility: CLINIC | Age: 56
End: 2021-07-19

## 2021-07-19 DIAGNOSIS — M25.561 CHRONIC PAIN OF RIGHT KNEE: ICD-10-CM

## 2021-07-19 DIAGNOSIS — M25.562 CHRONIC PAIN OF LEFT KNEE: ICD-10-CM

## 2021-07-19 DIAGNOSIS — G89.29 CHRONIC PAIN OF LEFT KNEE: ICD-10-CM

## 2021-07-19 DIAGNOSIS — M16.52 POST-TRAUMATIC OSTEOARTHRITIS OF LEFT HIP: Primary | ICD-10-CM

## 2021-07-19 DIAGNOSIS — M54.50 LUMBAR BACK PAIN: ICD-10-CM

## 2021-07-19 DIAGNOSIS — G89.29 CHRONIC PAIN OF RIGHT KNEE: ICD-10-CM

## 2021-07-19 RX ORDER — TRAMADOL HYDROCHLORIDE 50 MG/1
50 TABLET ORAL EVERY 6 HOURS PRN
Qty: 60 TABLET | Refills: 0 | Status: SHIPPED | OUTPATIENT
Start: 2021-07-19 | End: 2021-08-02 | Stop reason: SDUPTHER

## 2021-07-19 NOTE — TELEPHONE ENCOUNTER
Sent message to patient via [employer] letting her know Dr. Corrigan placed the order and central scheduling will call her after insurance approval to make appointment.    Maida Gregorio

## 2021-07-19 NOTE — TELEPHONE ENCOUNTER
Do you need to see patient before ordering hip injection?    Maida Gregorio        ----- Message from Jordyn Machado sent at 7/18/2021  8:39 PM EDT -----  Regarding: Referral Request  Contact: [phone]  Do I need to get  referral request for  Another hip injection..of the left hip..  Or can i call over to  the hospital an make my own appointment?

## 2021-07-19 NOTE — TELEPHONE ENCOUNTER
----- Message from Jordyn Machado sent at 7/18/2021  8:39 PM EDT -----  Regarding: Referral Request  Contact: [phone]  Do I need to get  referral request for  Another hip injection..of the left hip..  Or can i call over to  the hospital an make my own appointment?

## 2021-07-22 ENCOUNTER — OFFICE VISIT (OUTPATIENT)
Dept: FAMILY MEDICINE CLINIC | Facility: CLINIC | Age: 56
End: 2021-07-22

## 2021-07-22 VITALS
HEIGHT: 66 IN | TEMPERATURE: 98.2 F | RESPIRATION RATE: 18 BRPM | HEART RATE: 84 BPM | SYSTOLIC BLOOD PRESSURE: 128 MMHG | WEIGHT: 251 LBS | BODY MASS INDEX: 40.34 KG/M2 | DIASTOLIC BLOOD PRESSURE: 82 MMHG

## 2021-07-22 DIAGNOSIS — F41.9 ANXIETY: ICD-10-CM

## 2021-07-22 DIAGNOSIS — M25.50 ARTHRALGIA OF MULTIPLE JOINTS: Primary | ICD-10-CM

## 2021-07-22 DIAGNOSIS — I10 ESSENTIAL HYPERTENSION: ICD-10-CM

## 2021-07-22 PROCEDURE — 99214 OFFICE O/P EST MOD 30 MIN: CPT | Performed by: FAMILY MEDICINE

## 2021-07-22 NOTE — PROGRESS NOTES
Assessment/Plan       Diagnoses and all orders for this visit:    1. Arthralgia of multiple joints (Primary)    2. Anxiety    3. Essential hypertension           Follow up: Return in about 3 months (around 10/22/2021).     DISCUSSION  Arthralgia of multiple joints including significant arthritis of the left knee, SI joint and shoulder.  Continue tramadol 50 mg as needed and gabapentin 600 mg 3 times daily.  No evidence of misuse or diversion.  Taking medication appropriately.  Continue follow-up with orthopedics.    Anxiety.  Increasing anxiety with driving.  She is getting set up with a psychiatrist.    Hypertension.  Blood pressure is stable on current medications including lisinopril-hydrochlorothiazide 20-12 0.51 daily.    Refuses mammogram. colonoscopy and covid vaccines.  Explained risk for not completing these procedures and vaccinations.  Including risk of cancer.        MEDICATIONS PRESCRIBED  Requested Prescriptions      No prescriptions requested or ordered in this encounter              -------------------------------------------    Subjective     Chief Complaint   Patient presents with   • Knee Pain     f/u      Answers for HPI/ROS submitted by the patient on 7/15/2021  Please describe your symptoms.: Stabbing pain in left toe an foot.., Pain in left an right hip area..an in , Lower back..also in left knee  Have you had these symptoms before?: Yes  How long have you been having these symptoms?: Greater than 2 weeks  Please list any medications you are currently taking for this condition.: Ultram, Gabapentin  Please describe any probable cause for these symptoms. : Arthritis , Old surgery issues  What is the primary reason for your visit?: Other        History of Present Illness    Anxiety  Worsening anxiety  Driving now and get nervous  Does not know the area well  Plan on seeing [place] now that she had a car to get there      chronic pain   arthralgia    Left toe  + knot  + hurts  Stinging and sore pain  "  Had xray + oa    Lidoderm was better and insurance will not cover    Ultram helps    Knee pain   Left low back pain     Some pain in the right hip  Increased weight on the right hip    Main issues left side    Sees Dr Corrigan and injections do help    + knee arthritis        Social History     Tobacco Use   Smoking Status Never Smoker   Smokeless Tobacco Never Used          Past Medical History,Medications, Allergies, and social history was reviewed.          Review of Systems   Constitutional: Negative.    HENT: Negative.    Respiratory: Negative.    Cardiovascular: Negative.    Gastrointestinal: Negative.    Musculoskeletal: Positive for arthralgias and back pain.   Psychiatric/Behavioral: The patient is nervous/anxious.        Objective     Vitals:    07/22/21 1545   BP: 128/82   Pulse: 84   Resp: 18   Temp: 98.2 °F (36.8 °C)   Weight: 114 kg (251 lb)   Height: 167.6 cm (66\")          Physical Exam  Vitals and nursing note reviewed.   Constitutional:       Appearance: She is well-developed.   HENT:      Head: Normocephalic and atraumatic.      Right Ear: Hearing and external ear normal.      Left Ear: Hearing and external ear normal.   Eyes:      Conjunctiva/sclera: Conjunctivae normal.      Pupils: Pupils are equal, round, and reactive to light.   Cardiovascular:      Rate and Rhythm: Normal rate and regular rhythm.      Heart sounds: Normal heart sounds. No murmur heard.   No friction rub.   Pulmonary:      Effort: Pulmonary effort is normal. No respiratory distress.      Breath sounds: Normal breath sounds. No wheezing or rales.   Musculoskeletal:      Right lower leg: No edema.      Left lower leg: No edema.   Skin:     General: Skin is warm.   Neurological:      Mental Status: She is alert and oriented to person, place, and time.   Psychiatric:         Behavior: Behavior normal.       Decreased range of motion of bilateral knees.  Crepitus present.  Antalgic gait.              Dl Lucas MD    "

## 2021-07-29 RX ORDER — LISINOPRIL AND HYDROCHLOROTHIAZIDE 20; 12.5 MG/1; MG/1
1 TABLET ORAL DAILY
Qty: 90 TABLET | Refills: 1 | Status: SHIPPED | OUTPATIENT
Start: 2021-07-29 | End: 2021-11-01 | Stop reason: SDUPTHER

## 2021-07-29 NOTE — TELEPHONE ENCOUNTER
Last Office Visit: 07/22/2021  Next Office Visit: none     Labs completed in past 6 months? no  Labs completed in past year? yes        Pharmacy: [facility] [address] [place] [address] - [address] [facility] [phone] [facility] [phone]    [address] [facility] [address]   Phone:  [phone]  Fax:  [phone]    Please review pended refill request for any changes needed on refills or quantities. Thank you!

## 2021-08-02 DIAGNOSIS — G89.29 CHRONIC PAIN OF RIGHT KNEE: ICD-10-CM

## 2021-08-02 DIAGNOSIS — M25.561 CHRONIC PAIN OF RIGHT KNEE: ICD-10-CM

## 2021-08-02 DIAGNOSIS — G89.29 CHRONIC PAIN OF LEFT KNEE: ICD-10-CM

## 2021-08-02 DIAGNOSIS — M54.50 LUMBAR BACK PAIN: ICD-10-CM

## 2021-08-02 DIAGNOSIS — M25.562 CHRONIC PAIN OF LEFT KNEE: ICD-10-CM

## 2021-08-02 RX ORDER — TRAMADOL HYDROCHLORIDE 50 MG/1
50 TABLET ORAL EVERY 6 HOURS PRN
Qty: 60 TABLET | Refills: 2 | Status: SHIPPED | OUTPATIENT
Start: 2021-08-02 | End: 2021-09-12 | Stop reason: SDUPTHER

## 2021-08-05 ENCOUNTER — APPOINTMENT (OUTPATIENT)
Dept: GENERAL RADIOLOGY | Facility: HOSPITAL | Age: 56
End: 2021-08-05

## 2021-08-13 ENCOUNTER — HOSPITAL ENCOUNTER (OUTPATIENT)
Dept: GENERAL RADIOLOGY | Facility: HOSPITAL | Age: 56
Discharge: HOME | End: 2021-08-13
Admitting: RADIOLOGY

## 2021-08-13 DIAGNOSIS — M16.52 POST-TRAUMATIC OSTEOARTHRITIS OF LEFT HIP: ICD-10-CM

## 2021-08-13 PROCEDURE — 25010000002 TRIAMCINOLONE PER 10 MG: Performed by: ORTHOPAEDIC SURGERY

## 2021-08-13 PROCEDURE — 25010000002 IOPAMIDOL 61 % SOLUTION: Performed by: ORTHOPAEDIC SURGERY

## 2021-08-13 PROCEDURE — 77002 NEEDLE LOCALIZATION BY XRAY: CPT

## 2021-08-13 RX ORDER — LIDOCAINE HYDROCHLORIDE 10 MG/ML
5 INJECTION, SOLUTION EPIDURAL; INFILTRATION; INTRACAUDAL; PERINEURAL ONCE
Status: COMPLETED | OUTPATIENT
Start: 2021-08-13 | End: 2021-08-13

## 2021-08-13 RX ORDER — BUPIVACAINE HYDROCHLORIDE 2.5 MG/ML
3 INJECTION, SOLUTION EPIDURAL; INFILTRATION; INTRACAUDAL ONCE
Status: COMPLETED | OUTPATIENT
Start: 2021-08-13 | End: 2021-08-13

## 2021-08-13 RX ORDER — TRIAMCINOLONE ACETONIDE 40 MG/ML
80 INJECTION, SUSPENSION INTRA-ARTICULAR; INTRAMUSCULAR ONCE
Status: COMPLETED | OUTPATIENT
Start: 2021-08-13 | End: 2021-08-13

## 2021-08-13 RX ORDER — LIDOCAINE HYDROCHLORIDE 10 MG/ML
3 INJECTION, SOLUTION EPIDURAL; INFILTRATION; INTRACAUDAL; PERINEURAL ONCE
Status: COMPLETED | OUTPATIENT
Start: 2021-08-13 | End: 2021-08-13

## 2021-08-13 RX ADMIN — BUPIVACAINE HYDROCHLORIDE 3 ML: 2.5 INJECTION, SOLUTION EPIDURAL; INFILTRATION; INTRACAUDAL; PERINEURAL at 13:35

## 2021-08-13 RX ADMIN — LIDOCAINE HYDROCHLORIDE 3 ML: 10 INJECTION, SOLUTION EPIDURAL; INFILTRATION; INTRACAUDAL; PERINEURAL at 13:35

## 2021-08-13 RX ADMIN — TRIAMCINOLONE ACETONIDE 80 MG: 40 INJECTION, SUSPENSION INTRA-ARTICULAR; INTRAMUSCULAR at 13:35

## 2021-08-13 RX ADMIN — LIDOCAINE HYDROCHLORIDE 5 ML: 10 INJECTION, SOLUTION EPIDURAL; INFILTRATION; INTRACAUDAL; PERINEURAL at 13:35

## 2021-08-13 RX ADMIN — IOPAMIDOL 5 ML: 612 INJECTION, SOLUTION INTRAVENOUS at 13:35

## 2021-08-23 DIAGNOSIS — G89.29 CHRONIC PAIN OF LEFT KNEE: ICD-10-CM

## 2021-08-23 DIAGNOSIS — G89.29 CHRONIC PAIN OF RIGHT KNEE: ICD-10-CM

## 2021-08-23 DIAGNOSIS — M25.561 CHRONIC PAIN OF RIGHT KNEE: ICD-10-CM

## 2021-08-23 DIAGNOSIS — M54.50 LUMBAR BACK PAIN: ICD-10-CM

## 2021-08-23 DIAGNOSIS — M25.562 CHRONIC PAIN OF LEFT KNEE: ICD-10-CM

## 2021-08-23 RX ORDER — TIZANIDINE 4 MG/1
4-8 TABLET ORAL NIGHTLY PRN
Qty: 60 TABLET | Refills: 2 | Status: SHIPPED | OUTPATIENT
Start: 2021-08-23 | End: 2021-11-17

## 2021-09-06 DIAGNOSIS — G89.29 CHRONIC PAIN OF LEFT KNEE: ICD-10-CM

## 2021-09-06 DIAGNOSIS — M25.562 CHRONIC PAIN OF LEFT KNEE: ICD-10-CM

## 2021-09-06 DIAGNOSIS — G62.9 NEUROPATHY: ICD-10-CM

## 2021-09-07 RX ORDER — GABAPENTIN 600 MG/1
600 TABLET ORAL 3 TIMES DAILY
Qty: 90 TABLET | Refills: 1 | Status: SHIPPED | OUTPATIENT
Start: 2021-09-07 | End: 2021-11-01 | Stop reason: SDUPTHER

## 2021-09-12 DIAGNOSIS — M54.50 LUMBAR BACK PAIN: ICD-10-CM

## 2021-09-12 DIAGNOSIS — M25.562 CHRONIC PAIN OF LEFT KNEE: ICD-10-CM

## 2021-09-12 DIAGNOSIS — G89.29 CHRONIC PAIN OF RIGHT KNEE: ICD-10-CM

## 2021-09-12 DIAGNOSIS — G89.29 CHRONIC PAIN OF LEFT KNEE: ICD-10-CM

## 2021-09-12 DIAGNOSIS — M25.561 CHRONIC PAIN OF RIGHT KNEE: ICD-10-CM

## 2021-09-13 RX ORDER — TRAMADOL HYDROCHLORIDE 50 MG/1
50 TABLET ORAL EVERY 6 HOURS PRN
Qty: 60 TABLET | Refills: 2 | Status: SHIPPED | OUTPATIENT
Start: 2021-09-13 | End: 2021-10-24 | Stop reason: SDUPTHER

## 2021-09-20 RX ORDER — MELOXICAM 15 MG/1
TABLET ORAL
Qty: 30 TABLET | Refills: 0 | Status: SHIPPED | OUTPATIENT
Start: 2021-09-20 | End: 2021-10-17 | Stop reason: SDUPTHER

## 2021-10-18 RX ORDER — MELOXICAM 15 MG/1
15 TABLET ORAL DAILY
Qty: 30 TABLET | Refills: 1 | Status: SHIPPED | OUTPATIENT
Start: 2021-10-18 | End: 2021-11-21 | Stop reason: SDUPTHER

## 2021-10-24 ENCOUNTER — TELEPHONE (OUTPATIENT)
Dept: FAMILY MEDICINE CLINIC | Facility: CLINIC | Age: 56
End: 2021-10-24

## 2021-10-24 DIAGNOSIS — M25.562 CHRONIC PAIN OF LEFT KNEE: ICD-10-CM

## 2021-10-24 DIAGNOSIS — G89.29 CHRONIC PAIN OF RIGHT KNEE: ICD-10-CM

## 2021-10-24 DIAGNOSIS — M25.561 CHRONIC PAIN OF RIGHT KNEE: ICD-10-CM

## 2021-10-24 DIAGNOSIS — M54.50 LUMBAR BACK PAIN: ICD-10-CM

## 2021-10-24 DIAGNOSIS — G89.29 CHRONIC PAIN OF LEFT KNEE: ICD-10-CM

## 2021-10-25 RX ORDER — TRAMADOL HYDROCHLORIDE 50 MG/1
50 TABLET ORAL EVERY 6 HOURS PRN
Qty: 60 TABLET | Refills: 1 | Status: SHIPPED | OUTPATIENT
Start: 2021-10-25 | End: 2021-11-21 | Stop reason: SDUPTHER

## 2021-11-01 DIAGNOSIS — M25.562 CHRONIC PAIN OF LEFT KNEE: ICD-10-CM

## 2021-11-01 DIAGNOSIS — G62.9 NEUROPATHY: ICD-10-CM

## 2021-11-01 DIAGNOSIS — G89.29 CHRONIC PAIN OF LEFT KNEE: ICD-10-CM

## 2021-11-01 RX ORDER — GABAPENTIN 600 MG/1
600 TABLET ORAL 3 TIMES DAILY
Qty: 90 TABLET | Refills: 1 | Status: SHIPPED | OUTPATIENT
Start: 2021-11-01 | End: 2021-12-26 | Stop reason: SDUPTHER

## 2021-11-01 RX ORDER — LISINOPRIL AND HYDROCHLOROTHIAZIDE 20; 12.5 MG/1; MG/1
1 TABLET ORAL DAILY
Qty: 90 TABLET | Refills: 1 | Status: SHIPPED | OUTPATIENT
Start: 2021-11-01 | End: 2022-02-01 | Stop reason: SDUPTHER

## 2021-11-01 NOTE — TELEPHONE ENCOUNTER
Rx Refill Note  Requested Prescriptions     Pending Prescriptions Disp Refills   • lisinopril-hydrochlorothiazide (PRINZIDE,ZESTORETIC) 20-12.5 MG per tablet 90 tablet 1     Sig: Take 1 tablet by mouth Daily.   • gabapentin (NEURONTIN) 600 MG tablet 90 tablet 1     Sig: Take 1 tablet by mouth 3 (Three) Times a Day.      Last office visit with prescribing clinician: 7/22/2021      Next office visit with prescribing clinician: 11/18/2021            Jojo Diez MA  11/01/21, 14:11 EDT

## 2021-11-15 DIAGNOSIS — M54.50 LUMBAR BACK PAIN: ICD-10-CM

## 2021-11-15 DIAGNOSIS — G89.29 CHRONIC PAIN OF LEFT KNEE: ICD-10-CM

## 2021-11-15 DIAGNOSIS — M25.561 CHRONIC PAIN OF RIGHT KNEE: ICD-10-CM

## 2021-11-15 DIAGNOSIS — G89.29 CHRONIC PAIN OF RIGHT KNEE: ICD-10-CM

## 2021-11-15 DIAGNOSIS — M25.562 CHRONIC PAIN OF LEFT KNEE: ICD-10-CM

## 2021-11-17 RX ORDER — TIZANIDINE 4 MG/1
TABLET ORAL
Qty: 60 TABLET | Refills: 2 | Status: SHIPPED | OUTPATIENT
Start: 2021-11-17 | End: 2022-02-09 | Stop reason: SDUPTHER

## 2021-11-18 ENCOUNTER — OFFICE VISIT (OUTPATIENT)
Dept: FAMILY MEDICINE CLINIC | Facility: CLINIC | Age: 56
End: 2021-11-18

## 2021-11-18 VITALS
BODY MASS INDEX: 40.18 KG/M2 | HEART RATE: 78 BPM | RESPIRATION RATE: 18 BRPM | WEIGHT: 250 LBS | SYSTOLIC BLOOD PRESSURE: 132 MMHG | DIASTOLIC BLOOD PRESSURE: 80 MMHG | HEIGHT: 66 IN | TEMPERATURE: 98.4 F

## 2021-11-18 DIAGNOSIS — E78.2 MIXED HYPERLIPIDEMIA: ICD-10-CM

## 2021-11-18 DIAGNOSIS — M25.552 LEFT HIP PAIN: ICD-10-CM

## 2021-11-18 DIAGNOSIS — M79.672 LEFT FOOT PAIN: Primary | ICD-10-CM

## 2021-11-18 DIAGNOSIS — I10 ESSENTIAL HYPERTENSION: ICD-10-CM

## 2021-11-18 DIAGNOSIS — G89.29 CHRONIC PAIN OF LEFT KNEE: ICD-10-CM

## 2021-11-18 DIAGNOSIS — M25.562 CHRONIC PAIN OF LEFT KNEE: ICD-10-CM

## 2021-11-18 LAB
ALBUMIN SERPL-MCNC: 4.6 G/DL (ref 3.5–5.2)
ALBUMIN/GLOB SERPL: 2.3 G/DL
ALP SERPL-CCNC: 88 U/L (ref 39–117)
ALT SERPL-CCNC: 19 U/L (ref 1–33)
AST SERPL-CCNC: 22 U/L (ref 1–32)
BASOPHILS # BLD AUTO: 0.08 10*3/MM3 (ref 0–0.2)
BASOPHILS NFR BLD AUTO: 1.3 % (ref 0–1.5)
BILIRUB SERPL-MCNC: 0.3 MG/DL (ref 0–1.2)
BUN SERPL-MCNC: 17 MG/DL (ref 6–20)
BUN/CREAT SERPL: 19.8 (ref 7–25)
CALCIUM SERPL-MCNC: 9.3 MG/DL (ref 8.6–10.5)
CHLORIDE SERPL-SCNC: 99 MMOL/L (ref 98–107)
CHOLEST SERPL-MCNC: 188 MG/DL (ref 0–200)
CHOLEST/HDLC SERPL: 2.69 {RATIO}
CO2 SERPL-SCNC: 28 MMOL/L (ref 22–29)
CREAT SERPL-MCNC: 0.86 MG/DL (ref 0.57–1)
EOSINOPHIL # BLD AUTO: 0.35 10*3/MM3 (ref 0–0.4)
EOSINOPHIL NFR BLD AUTO: 5.7 % (ref 0.3–6.2)
ERYTHROCYTE [DISTWIDTH] IN BLOOD BY AUTOMATED COUNT: 12 % (ref 12.3–15.4)
GLOBULIN SER CALC-MCNC: 2 GM/DL
GLUCOSE SERPL-MCNC: 90 MG/DL (ref 65–99)
HCT VFR BLD AUTO: 40.8 % (ref 34–46.6)
HDLC SERPL-MCNC: 70 MG/DL (ref 40–60)
HGB BLD-MCNC: 13.2 G/DL (ref 12–15.9)
IMM GRANULOCYTES # BLD AUTO: 0.02 10*3/MM3 (ref 0–0.05)
IMM GRANULOCYTES NFR BLD AUTO: 0.3 % (ref 0–0.5)
LDLC SERPL CALC-MCNC: 84 MG/DL (ref 0–100)
LYMPHOCYTES # BLD AUTO: 2.06 10*3/MM3 (ref 0.7–3.1)
LYMPHOCYTES NFR BLD AUTO: 33.4 % (ref 19.6–45.3)
MCH RBC QN AUTO: 28.2 PG (ref 26.6–33)
MCHC RBC AUTO-ENTMCNC: 32.4 G/DL (ref 31.5–35.7)
MCV RBC AUTO: 87.2 FL (ref 79–97)
MONOCYTES # BLD AUTO: 0.46 10*3/MM3 (ref 0.1–0.9)
MONOCYTES NFR BLD AUTO: 7.5 % (ref 5–12)
NEUTROPHILS # BLD AUTO: 3.19 10*3/MM3 (ref 1.7–7)
NEUTROPHILS NFR BLD AUTO: 51.8 % (ref 42.7–76)
NRBC BLD AUTO-RTO: 0 /100 WBC (ref 0–0.2)
PLATELET # BLD AUTO: 270 10*3/MM3 (ref 140–450)
POTASSIUM SERPL-SCNC: 4 MMOL/L (ref 3.5–5.2)
PROT SERPL-MCNC: 6.6 G/DL (ref 6–8.5)
RBC # BLD AUTO: 4.68 10*6/MM3 (ref 3.77–5.28)
SODIUM SERPL-SCNC: 139 MMOL/L (ref 136–145)
TRIGL SERPL-MCNC: 205 MG/DL (ref 0–150)
VLDLC SERPL CALC-MCNC: 34 MG/DL (ref 5–40)
WBC # BLD AUTO: 6.16 10*3/MM3 (ref 3.4–10.8)

## 2021-11-18 PROCEDURE — 99214 OFFICE O/P EST MOD 30 MIN: CPT | Performed by: FAMILY MEDICINE

## 2021-11-18 NOTE — PROGRESS NOTES
"Chief Complaint  Knee Pain (f/u ) and Toe Pain (left foot )    Subjective          Jordyn Machado presents to [facility]  History of Present Illness    Left great toe pain  Ms. Machado presents today stating that her left great toe is severely painful, and stings. She adds that she uses Biofreeze 3 times daily due to the pain, which helps as long as she is not wearing shoes. She notes that the pain is affecting the ways she walks, and walks \"on the side to keep from having to flex my toe.\" When she sleeps, she notes she cannot sleep where her foot is going to lie on toe.    Chronic bilateral knee pain, left worse than right/Left hip pain  She reports that in 2004 she had 3 rods placed into her left hip. She received a left hip injection, which brought relief for 1 month.    Left back pain  She reports that this pain is worsening. She continues to take gabapentin 3 times daily, and tramadol, which is helpful except for her great toe pain. She also continues on meloxicam 15 mg daily.    Hypertension  Blood pressure looks good today. She denies chest pain or difficulty breathing. She denies headaches.     Habits  She is a nonsmoker.    Review of Systems   A review of systems was performed, and the pertinent positives are noted in the HPI.    Objective       Vital Signs:   /80   Pulse 78   Temp 98.4 °F (36.9 °C)   Resp 18   Ht 167.6 cm (66\")   Wt 113 kg (250 lb)   BMI 40.35 kg/m²     Physical Exam  Vitals and nursing note reviewed.   Constitutional:       Appearance: She is well-developed.   HENT:      Head: Normocephalic and atraumatic.      Right Ear: Hearing and external ear normal.      Left Ear: Hearing and external ear normal.   Eyes:      Conjunctiva/sclera: Conjunctivae normal.      Pupils: Pupils are equal, round, and reactive to light.   Cardiovascular:      Rate and Rhythm: Normal rate and regular rhythm.      Heart sounds: Normal heart sounds. No murmur " heard.  No friction rub.   Pulmonary:      Effort: Pulmonary effort is normal. No respiratory distress.      Breath sounds: Normal breath sounds. No wheezing or rales.   Abdominal:      General: There is no distension.      Tenderness: There is no abdominal tenderness. There is no guarding or rebound.   Musculoskeletal:      Right lower leg: No edema.      Left lower leg: No edema.      Comments: +++ tenderness left foot at 1st MTP   Skin:     General: Skin is warm.   Neurological:      Mental Status: She is alert.   Psychiatric:         Behavior: Behavior normal.     Antalgic gait.  Pain with range of motion of both knees.  Good range of motion of left hip but there is tenderness with range of motion.    Result Review :                     Assessment and Plan    Diagnoses and all orders for this visit:    1. Left foot pain (Primary)  -     Ambulatory Referral to Podiatry    2. Chronic pain of left knee    3. Left hip pain    4. Essential hypertension  -     Comprehensive Metabolic Panel  -     Lipid Panel With / Chol / HDL Ratio  -     CBC & Differential    5. Mixed hyperlipidemia  -     Comprehensive Metabolic Panel  -     Lipid Panel With / Chol / HDL Ratio    1. Left great toe pain  - I will put in a referral to a podiatrist.  - She will continue taking her pain medication.    2. Pain in her left knee with arthritis, and left hip pain  - She will continue taking gabapentin, meloxicam, and tramadol.  Taking medication appropriately with no evidence of misuse or diversion    3. Hypertension  - Blood pressure is well-controlled.    4. Health maintenance  - She has not received the COVID-19 vaccine, and does not wish to receive it. She was encouraged to get the vaccine.  - She has not received an influenza vaccine, and does not wish to receive one. She was encouraged to get the vaccine.  - She has never had a mammogram, and wishes to wait on scheduling the test. She was encouraged to consider scheduling the  mammogram.  - She wants to be scheduled for a Pap test. She requests being scheduled here in the clinic with a female.  - She does not wish to have a colon cancer screening with a colonoscopy.  - She does not need medication refills today.  - She will receive blood work today.          DISCUSSION        Cristiano dated 11/18/2021  was reviewed and appropriate.     Follow Up   No follow-ups on file.    Patient was given instructions and counseling regarding her condition or for health maintenance advice. Please see specific information pulled into the AVS if appropriate.       Dl Lucas MD     Transcribed from ambient dictation for Dl Lucas MD by Deborah Johnson.  11/18/21   12:36 EST    Patient verbalized consent to the visit recording.  I have personally performed the services described in this document as transcribed by the above individual, and it is both accurate and complete.  Dl Lucas MD  11/18/2021  17:46 EST

## 2021-11-21 DIAGNOSIS — G89.29 CHRONIC PAIN OF RIGHT KNEE: ICD-10-CM

## 2021-11-21 DIAGNOSIS — M25.562 CHRONIC PAIN OF LEFT KNEE: ICD-10-CM

## 2021-11-21 DIAGNOSIS — M25.561 CHRONIC PAIN OF RIGHT KNEE: ICD-10-CM

## 2021-11-21 DIAGNOSIS — M54.50 LUMBAR BACK PAIN: ICD-10-CM

## 2021-11-21 DIAGNOSIS — G89.29 CHRONIC PAIN OF LEFT KNEE: ICD-10-CM

## 2021-11-22 RX ORDER — TRAMADOL HYDROCHLORIDE 50 MG/1
50 TABLET ORAL EVERY 6 HOURS PRN
Qty: 60 TABLET | Refills: 2 | Status: SHIPPED | OUTPATIENT
Start: 2021-11-22 | End: 2022-01-02 | Stop reason: SDUPTHER

## 2021-11-22 RX ORDER — MELOXICAM 15 MG/1
15 TABLET ORAL DAILY
Qty: 30 TABLET | Refills: 2 | Status: SHIPPED | OUTPATIENT
Start: 2021-11-22 | End: 2022-02-14 | Stop reason: SDUPTHER

## 2021-12-26 DIAGNOSIS — M25.562 CHRONIC PAIN OF LEFT KNEE: ICD-10-CM

## 2021-12-26 DIAGNOSIS — G62.9 NEUROPATHY: ICD-10-CM

## 2021-12-26 DIAGNOSIS — G89.29 CHRONIC PAIN OF LEFT KNEE: ICD-10-CM

## 2021-12-27 RX ORDER — GABAPENTIN 600 MG/1
600 TABLET ORAL 3 TIMES DAILY
Qty: 90 TABLET | Refills: 1 | Status: SHIPPED | OUTPATIENT
Start: 2021-12-27 | End: 2022-02-20 | Stop reason: SDUPTHER

## 2021-12-27 NOTE — TELEPHONE ENCOUNTER
Rx Refill Note  Requested Prescriptions     Pending Prescriptions Disp Refills   • gabapentin (NEURONTIN) 600 MG tablet 90 tablet 1     Sig: Take 1 tablet by mouth 3 (Three) Times a Day.      Last office visit with prescribing clinician: 11/18/2021      Next office visit with prescribing clinician: Visit date not found            Jojo Diez MA  12/27/21, 07:46 EST

## 2022-01-02 DIAGNOSIS — M25.561 CHRONIC PAIN OF RIGHT KNEE: ICD-10-CM

## 2022-01-02 DIAGNOSIS — G89.29 CHRONIC PAIN OF RIGHT KNEE: ICD-10-CM

## 2022-01-02 DIAGNOSIS — M54.50 LUMBAR BACK PAIN: ICD-10-CM

## 2022-01-02 DIAGNOSIS — M25.562 CHRONIC PAIN OF LEFT KNEE: ICD-10-CM

## 2022-01-02 DIAGNOSIS — G89.29 CHRONIC PAIN OF LEFT KNEE: ICD-10-CM

## 2022-01-03 DIAGNOSIS — M25.561 CHRONIC PAIN OF RIGHT KNEE: ICD-10-CM

## 2022-01-03 DIAGNOSIS — G89.29 CHRONIC PAIN OF RIGHT KNEE: ICD-10-CM

## 2022-01-03 DIAGNOSIS — M25.562 CHRONIC PAIN OF LEFT KNEE: ICD-10-CM

## 2022-01-03 DIAGNOSIS — M54.50 LUMBAR BACK PAIN: ICD-10-CM

## 2022-01-03 DIAGNOSIS — G89.29 CHRONIC PAIN OF LEFT KNEE: ICD-10-CM

## 2022-01-03 RX ORDER — TRAMADOL HYDROCHLORIDE 50 MG/1
50 TABLET ORAL EVERY 6 HOURS PRN
Qty: 60 TABLET | Refills: 2 | Status: CANCELLED | OUTPATIENT
Start: 2022-01-03

## 2022-01-03 RX ORDER — TRAMADOL HYDROCHLORIDE 50 MG/1
50 TABLET ORAL EVERY 6 HOURS PRN
Qty: 60 TABLET | Refills: 2 | Status: SHIPPED | OUTPATIENT
Start: 2022-01-03 | End: 2022-01-04 | Stop reason: SDUPTHER

## 2022-01-03 NOTE — TELEPHONE ENCOUNTER
Rx Refill Note  Requested Prescriptions     Pending Prescriptions Disp Refills   • traMADol (ULTRAM) 50 MG tablet 60 tablet 2     Sig: Take 1 tablet by mouth Every 6 (Six) Hours As Needed for Moderate Pain .      Last office visit with prescribing clinician: 11/18/2021      Next office visit with prescribing clinician: Visit date not found            Jojo Diez MA  01/03/22, 17:16 EST

## 2022-01-03 NOTE — TELEPHONE ENCOUNTER
Rx Refill Note  Requested Prescriptions     Pending Prescriptions Disp Refills   • traMADol (ULTRAM) 50 MG tablet 60 tablet 2     Sig: Take 1 tablet by mouth Every 6 (Six) Hours As Needed for Moderate Pain .      Last office visit with prescribing clinician: 11/18/2021      Next office visit with prescribing clinician: Visit date not found            Jojo Diez MA  01/03/22, 07:46 EST

## 2022-01-04 ENCOUNTER — TELEPHONE (OUTPATIENT)
Dept: FAMILY MEDICINE CLINIC | Facility: CLINIC | Age: 57
End: 2022-01-04

## 2022-01-04 DIAGNOSIS — G89.29 CHRONIC PAIN OF RIGHT KNEE: ICD-10-CM

## 2022-01-04 DIAGNOSIS — M25.562 CHRONIC PAIN OF LEFT KNEE: ICD-10-CM

## 2022-01-04 DIAGNOSIS — M25.561 CHRONIC PAIN OF RIGHT KNEE: ICD-10-CM

## 2022-01-04 DIAGNOSIS — G89.29 CHRONIC PAIN OF LEFT KNEE: ICD-10-CM

## 2022-01-04 DIAGNOSIS — M54.50 LUMBAR BACK PAIN: ICD-10-CM

## 2022-01-04 RX ORDER — TRAMADOL HYDROCHLORIDE 50 MG/1
50 TABLET ORAL EVERY 6 HOURS PRN
Qty: 60 TABLET | Refills: 2 | Status: SHIPPED | OUTPATIENT
Start: 2022-01-04 | End: 2022-02-15 | Stop reason: SDUPTHER

## 2022-01-04 NOTE — TELEPHONE ENCOUNTER
PATIENT CALLED TO INFORM THE PRACTICE THAT [facility] DOES NOT HAVE THIS MEDICATION: TRAMADOL 50 MG TABLET IN STOCK AS IT IS ON BACK ORDER AND DO NOT KNOW WHEN THEY WILL GET IT IN. PATIENT STATES THAT [facility] HAS ADVISED HER TO CALL AROUND AND SEE WHICH PHARMACY ACTUALLY HAS THIS MEDICATION IN STOCK, HAVE THE MEDICAL PROVIDER  REQUEST A CANCELLATION OF THE PRESCRIPTION FROM [facility] , AND HAVE THE PROVIDER TO WRITE A NEW PRESCRIPTION TO THE PHARMACY THAT DOES HAVE IT IN STOCK IN ORDER FOR THE PATIENT TO GET THIS MEDICATION.     PATIENT CONTACT: [phone] (I)    PATIENT STATES THAT SHE HAS TAKEN HER LAST PILL TODAY.     PATIENT STATES THAT SHE WILL CONTACT [facility] TO SEE IF THEY HAVE THIS MEDICATION IN STOCK AND WILL CALL BACK WITH THE PREFERRED PHARMACY INFORMATION.     PATIENT HAS BEEN ADVISED THAT THIS REQUEST HAS BEEN MARKED AS A HIGH PRIORITY, PLEASE ALLOW 48 HOURS FOR OUR CLINICAL TEAM TO FOLLOW UP ON THIS REQUEST.

## 2022-01-04 NOTE — TELEPHONE ENCOUNTER
Call patient and let her know to let us know as soon as she finds out who has it and I can send that in.

## 2022-01-04 NOTE — TELEPHONE ENCOUNTER
/    Caller: Jordyn Machado    Relationship: Self    Best call back number:     Requested Prescriptions:   Requested Prescriptions     Pending Prescriptions Disp Refills   • traMADol (ULTRAM) 50 MG tablet 60 tablet 2     Sig: Take 1 tablet by mouth Every 6 (Six) Hours As Needed for Moderate Pain .        Pharmacy where request should be sent: [facility] [address] [phone] [facility] [phone]      Additional details provided by patient: PATIENT HAS [facility], BUT [facility] THE [facility]ICATION [facility] ORDER; SHE CALLED BACK TO SAY [facility] IN [place] HAS THIS MEDICATION; SHE IS COMPLETELY OUT OF MEDICATION, TAKING THE LAST ONE THIS MORNING    Does the patient have less than a 3 day supply:  [x] Yes  [] No    Nathan Tripathi Rep   01/04/22 10:47 EST

## 2022-01-05 ENCOUNTER — OFFICE VISIT (OUTPATIENT)
Dept: FAMILY MEDICINE CLINIC | Facility: CLINIC | Age: 57
End: 2022-01-05

## 2022-01-05 VITALS
DIASTOLIC BLOOD PRESSURE: 90 MMHG | BODY MASS INDEX: 40.76 KG/M2 | RESPIRATION RATE: 20 BRPM | WEIGHT: 253.6 LBS | HEART RATE: 103 BPM | OXYGEN SATURATION: 98 % | TEMPERATURE: 98.4 F | HEIGHT: 66 IN | SYSTOLIC BLOOD PRESSURE: 128 MMHG

## 2022-01-05 DIAGNOSIS — Z00.00 MEDICARE ANNUAL WELLNESS VISIT, INITIAL: Primary | ICD-10-CM

## 2022-01-05 DIAGNOSIS — E66.01 OBESITY, MORBID: ICD-10-CM

## 2022-01-05 DIAGNOSIS — Z12.4 PAP SMEAR FOR CERVICAL CANCER SCREENING: ICD-10-CM

## 2022-01-05 PROCEDURE — 3008F BODY MASS INDEX DOCD: CPT | Performed by: FAMILY MEDICINE

## 2022-01-05 PROCEDURE — G0439 PPPS, SUBSEQ VISIT: HCPCS | Performed by: FAMILY MEDICINE

## 2022-01-05 PROCEDURE — 3288F FALL RISK ASSESSMENT DOCD: CPT | Performed by: FAMILY MEDICINE

## 2022-01-05 PROCEDURE — 1160F RVW MEDS BY RX/DR IN RCRD: CPT | Performed by: FAMILY MEDICINE

## 2022-01-05 PROCEDURE — 1170F FXNL STATUS ASSESSED: CPT | Performed by: FAMILY MEDICINE

## 2022-01-05 NOTE — PROGRESS NOTES
The ABCs of the Annual Wellness Visit  Initial Medicare Wellness Visit    Chief Complaint   Patient presents with   • Annual Exam w/ PAP     Subjective   History of Present Illness:  Jordyn Machado is a 56 y.o. female who presents for an Initial Medicare Wellness Visit.  She has had abnormal pap smear before, years ago. Had a procedure, thinks in OR setting to treat. Unsure if cervix is intact.     The following portions of the patient's history were reviewed and   updated as appropriate: allergies, current medications, past family history, past medical history, past social history, past surgical history and problem list.     Compared to one year ago, the patient feels her physical   health is the same.    Compared to one year ago, the patient feels her mental   health is the same.    Recent Hospitalizations:  She was not admitted to the hospital during the last year.       Current Medical Providers:  Patient Care Team:  Dl Lucas MD as PCP - General (Family Medicine)    Outpatient Medications Prior to Visit   Medication Sig Dispense Refill   • gabapentin (NEURONTIN) 600 MG tablet Take 1 tablet by mouth 3 (Three) Times a Day. 90 tablet 1   • lisinopril-hydrochlorothiazide (PRINZIDE,ZESTORETIC) 20-12.5 MG per tablet Take 1 tablet by mouth Daily. 90 tablet 1   • meloxicam (MOBIC) 15 MG tablet Take 1 tablet by mouth Daily. 30 tablet 2   • tiZANidine (ZANAFLEX) 4 MG tablet TAKE 1 TO 2 TABLETS BY MOUTH AT NIGHT AS NEEDED FOR MUSCLE SPASM 60 tablet 2   • traMADol (ULTRAM) 50 MG tablet Take 1 tablet by mouth Every 6 (Six) Hours As Needed for Moderate Pain . 60 tablet 2     No facility-administered medications prior to visit.       Opioid medication/s are on active medication list.  and I have evaluated her active treatment plan and pain score trends (see table).  Vitals:    01/05/22 1407   PainSc:   8   PainLoc: Foot  Comment: left     I have reviewed the chart for potential of high risk medication and harmful  "drug interactions in the elderly.            Aspirin is not on active medication list.  Aspirin use is not indicated based on review of current medical condition/s. Risk of harm outweighs potential benefits.  .    There is no problem list on file for this patient.    Advance Care Planning  Advance Directive is not on file.  ACP discussion was held with the patient during this visit. Patient does not have an advance directive, information provided.    Review of Systems   Constitutional: Negative for activity change and appetite change.   Respiratory: Negative for cough and shortness of breath.    Cardiovascular: Negative for chest pain.   Genitourinary: Negative for vaginal discharge and vaginal pain.        Objective       Vitals:    01/05/22 1407   BP: 128/90   Pulse: 103   Resp: 20   Temp: 98.4 °F (36.9 °C)   SpO2: 98%   Weight: 115 kg (253 lb 9.6 oz)   Height: 167.6 cm (65.98\")   PainSc:   8   PainLoc: Foot  Comment: left     BMI Readings from Last 1 Encounters:   01/05/22 40.95 kg/m²   BMI is above normal parameters. Recommendations include: exercise counseling and nutrition counseling    Does the patient have evidence of cognitive impairment? No    Physical Exam  Vitals and nursing note reviewed.   Constitutional:       Appearance: She is well-developed. She is obese.   HENT:      Head: Normocephalic and atraumatic.      Right Ear: External ear normal.      Left Ear: External ear normal.      Nose: Nose normal.   Eyes:      Conjunctiva/sclera: Conjunctivae normal.   Cardiovascular:      Rate and Rhythm: Normal rate and regular rhythm.      Heart sounds: Normal heart sounds. No murmur heard.      Pulmonary:      Effort: Pulmonary effort is normal.      Breath sounds: Normal breath sounds. No wheezing.   Chest:   Breasts:      Right: No inverted nipple, mass, nipple discharge, skin change, tenderness, axillary adenopathy or supraclavicular adenopathy.      Left: No inverted nipple, mass, nipple discharge, skin " change, tenderness, axillary adenopathy or supraclavicular adenopathy.       Abdominal:      General: Bowel sounds are normal.      Palpations: Abdomen is soft.   Genitourinary:     General: Normal vulva.      Labia:         Right: No lesion.         Left: No lesion.       Vagina: Normal.      Cervix: No friability or cervical bleeding.      Uterus: Normal.       Adnexa: Right adnexa normal and left adnexa normal.        Right: No tenderness.          Left: No tenderness.        Comments: Difficulty visualizing entire cervix  Musculoskeletal:         General: No deformity.      Cervical back: Normal range of motion and neck supple.   Lymphadenopathy:      Upper Body:      Right upper body: No supraclavicular or axillary adenopathy.      Left upper body: No supraclavicular or axillary adenopathy.   Skin:     General: Skin is warm and dry.   Neurological:      Mental Status: She is alert and oriented to person, place, and time.   Psychiatric:         Behavior: Behavior normal.       Lab Results   Component Value Date    CHLPL 188 11/18/2021    TRIG 205 (H) 11/18/2021    HDL 70 (H) 11/18/2021    LDL 84 11/18/2021    VLDL 34 11/18/2021          HEALTH RISK ASSESSMENT    Smoking Status:  Social History     Tobacco Use   Smoking Status Never Smoker   Smokeless Tobacco Never Used     Alcohol Consumption:  Social History     Substance and Sexual Activity   Alcohol Use Never   • Alcohol/week: 0.0 standard drinks    Comment: occasional      Fall Risk Screen:    SERGEY Fall Risk Assessment was completed, and patient is at LOW risk for falls.Assessment completed on:1/5/2022    Depression Screen:   PHQ-2/PHQ-9 Depression Screening 1/5/2022   Little interest or pleasure in doing things 0   Feeling down, depressed, or hopeless 0   Feeling tired or having little energy -   Poor appetite or overeating -   Feeling bad about yourself - or that you are a failure or have let yourself or your family down -   Trouble concentrating on  things, such as reading the newspaper or watching television -   Moving or speaking so slowly that other people could have noticed. Or the opposite - being so fidgety or restless that you have been moving around a lot more than usual -   Thoughts that you would be better off dead, or of hurting yourself in some way -   Total Score 0   If you checked off any problems, how difficult have these problems made it for you to do your work, take care of things at home, or get along with other people? -       Health Habits and Functional and Cognitive Screening:  No flowsheet data found.    Age-appropriate Screening Schedule:  Refer to the list below for future screening recommendations based on patient's age, sex and/or medical conditions. Orders for these recommended tests are listed in the plan section. The patient has been provided with a written plan.    Health Maintenance   Topic Date Due   • TDAP/TD VACCINES (1 - Tdap) Never done   • ZOSTER VACCINE (2 of 2) 07/21/2020   • INFLUENZA VACCINE  03/31/2022 (Originally 8/1/2021)   • MAMMOGRAM  07/22/2022 (Originally 1965)   • LIPID PANEL  11/18/2022   • PAP SMEAR  01/05/2025            Assessment/Plan   CMS Preventative Services Quick Reference  Risk Factors Identified During Encounter  Obesity/Overweight   The above risks/problems have been discussed with the patient.  Follow up actions/plans if indicated are seen below in the Assessment/Plan Section.  Pertinent information has been shared with the patient in the After Visit Summary.    Diagnoses and all orders for this visit:    1. Medicare annual wellness visit, initial (Primary)    2. Obesity, morbid (HCC)    3. Pap smear for cervical cancer screening    4. Body mass index (BMI) of 40.0 to 44.9 in adult (HCC)      Pap smear completed today  Would be beneficial to have previous gynecological procedure history, completed at [facility] in [place], [place]. Will request records    Follow Up:  Return if symptoms worsen  or fail to improve.     An After Visit Summary and PPPS were made available to the patient.

## 2022-01-05 NOTE — PROGRESS NOTES
"Subjective   Chief Complaint   Patient presents with   • Annual Exam w/ PAP       History of Present Illness     Jordyn Machado is a 56 y.o. woman who comes in today for a  pap smear only. Her most recent annual exam was on ***. Her most recent Pap smear was on *** and showed {pap results:17394::\"no abnormalities\"}. Previous abnormal Pap smears: {yes***/no:01435}. Contraception: {method:5051}  GP  LMP:  Dental Exam:  Vision Exam:  Diet & Exercise:    The following portions of the patient's history were reviewed and updated as appropriate: allergies, current medications, past family history, past medical history, past social history, past surgical history and problem list.    Review of Systems    Objective   Physical Exam  Vitals and nursing note reviewed.   Constitutional:       Appearance: She is well-developed.   HENT:      Head: Normocephalic and atraumatic.      Right Ear: External ear normal.      Left Ear: External ear normal.      Nose: Nose normal.   Eyes:      Conjunctiva/sclera: Conjunctivae normal.   Cardiovascular:      Rate and Rhythm: Normal rate and regular rhythm.      Heart sounds: Normal heart sounds. No murmur heard.      Pulmonary:      Effort: Pulmonary effort is normal.      Breath sounds: Normal breath sounds. No wheezing.   Musculoskeletal:         General: No deformity.      Cervical back: Normal range of motion and neck supple.   Skin:     General: Skin is warm and dry.   Neurological:      Mental Status: She is alert and oriented to person, place, and time.   Psychiatric:         Behavior: Behavior normal.           Assessment/Plan   {Assess/PlanSmartLinks:43795}  Pap smear completed today    Health advice: healthy food choices with fresh fruits and vegetables, maintain sleep pattern at least 8 hours, avoid texting and distracted driving practices; wear safety belt, engage in regular exercise, maintain healthy weight, use safe sex practices, avoid alcohol and illicit drugs. Maintain " immunizations that are up to date. Maintain health maintenance: Mammogram, DEXA, PSA, Pap, etc.  Follow up with PCP if struggling with depression or anxiety. Keep regular dental and eye exams. Brush and floss teeth daily.   F/U annually and prn.

## 2022-02-01 RX ORDER — LISINOPRIL AND HYDROCHLOROTHIAZIDE 20; 12.5 MG/1; MG/1
1 TABLET ORAL DAILY
Qty: 90 TABLET | Refills: 1 | Status: SHIPPED | OUTPATIENT
Start: 2022-02-01 | End: 2022-07-26

## 2022-02-09 DIAGNOSIS — G89.29 CHRONIC PAIN OF RIGHT KNEE: ICD-10-CM

## 2022-02-09 DIAGNOSIS — M25.561 CHRONIC PAIN OF RIGHT KNEE: ICD-10-CM

## 2022-02-09 DIAGNOSIS — M25.562 CHRONIC PAIN OF LEFT KNEE: ICD-10-CM

## 2022-02-09 DIAGNOSIS — M54.50 LUMBAR BACK PAIN: ICD-10-CM

## 2022-02-09 DIAGNOSIS — G89.29 CHRONIC PAIN OF LEFT KNEE: ICD-10-CM

## 2022-02-10 ENCOUNTER — TELEPHONE (OUTPATIENT)
Dept: ORTHOPEDIC SURGERY | Facility: CLINIC | Age: 57
End: 2022-02-10

## 2022-02-10 RX ORDER — TIZANIDINE 4 MG/1
4-8 TABLET ORAL NIGHTLY PRN
Qty: 60 TABLET | Refills: 2 | Status: SHIPPED | OUTPATIENT
Start: 2022-02-10 | End: 2022-05-23

## 2022-02-14 ENCOUNTER — TELEPHONE (OUTPATIENT)
Dept: FAMILY MEDICINE CLINIC | Facility: CLINIC | Age: 57
End: 2022-02-14

## 2022-02-14 DIAGNOSIS — G89.29 CHRONIC PAIN OF LEFT KNEE: ICD-10-CM

## 2022-02-14 DIAGNOSIS — M25.562 CHRONIC PAIN OF LEFT KNEE: ICD-10-CM

## 2022-02-14 DIAGNOSIS — M25.561 CHRONIC PAIN OF RIGHT KNEE: ICD-10-CM

## 2022-02-14 DIAGNOSIS — M54.50 LUMBAR BACK PAIN: ICD-10-CM

## 2022-02-14 DIAGNOSIS — G89.29 CHRONIC PAIN OF RIGHT KNEE: ICD-10-CM

## 2022-02-14 RX ORDER — MELOXICAM 15 MG/1
15 TABLET ORAL DAILY
Qty: 30 TABLET | Refills: 2 | Status: SHIPPED | OUTPATIENT
Start: 2022-02-14 | End: 2022-05-23

## 2022-02-14 RX ORDER — TRAMADOL HYDROCHLORIDE 50 MG/1
50 TABLET ORAL EVERY 6 HOURS PRN
Qty: 60 TABLET | Refills: 2 | Status: CANCELLED | OUTPATIENT
Start: 2022-02-14

## 2022-02-14 NOTE — TELEPHONE ENCOUNTER
Please call patient.  We received a refill request for tramadol but this was sent in on January 4 +2 refills so she should have refills remaining.

## 2022-02-15 DIAGNOSIS — M25.562 CHRONIC PAIN OF LEFT KNEE: ICD-10-CM

## 2022-02-15 DIAGNOSIS — M25.561 CHRONIC PAIN OF RIGHT KNEE: ICD-10-CM

## 2022-02-15 DIAGNOSIS — G89.29 CHRONIC PAIN OF RIGHT KNEE: ICD-10-CM

## 2022-02-15 DIAGNOSIS — G89.29 CHRONIC PAIN OF LEFT KNEE: ICD-10-CM

## 2022-02-15 DIAGNOSIS — M54.50 LUMBAR BACK PAIN: ICD-10-CM

## 2022-02-15 RX ORDER — TRAMADOL HYDROCHLORIDE 50 MG/1
50 TABLET ORAL EVERY 6 HOURS PRN
Qty: 60 TABLET | Refills: 2 | Status: SHIPPED | OUTPATIENT
Start: 2022-02-15 | End: 2022-03-29 | Stop reason: SDUPTHER

## 2022-02-20 DIAGNOSIS — M25.562 CHRONIC PAIN OF LEFT KNEE: ICD-10-CM

## 2022-02-20 DIAGNOSIS — G62.9 NEUROPATHY: ICD-10-CM

## 2022-02-20 DIAGNOSIS — G89.29 CHRONIC PAIN OF LEFT KNEE: ICD-10-CM

## 2022-02-21 RX ORDER — GABAPENTIN 600 MG/1
600 TABLET ORAL 3 TIMES DAILY
Qty: 90 TABLET | Refills: 1 | Status: SHIPPED | OUTPATIENT
Start: 2022-02-21 | End: 2022-04-18

## 2022-03-29 ENCOUNTER — TELEPHONE (OUTPATIENT)
Dept: FAMILY MEDICINE CLINIC | Facility: CLINIC | Age: 57
End: 2022-03-29

## 2022-03-29 DIAGNOSIS — M25.561 CHRONIC PAIN OF RIGHT KNEE: ICD-10-CM

## 2022-03-29 DIAGNOSIS — M25.562 CHRONIC PAIN OF LEFT KNEE: ICD-10-CM

## 2022-03-29 DIAGNOSIS — G89.29 CHRONIC PAIN OF RIGHT KNEE: ICD-10-CM

## 2022-03-29 DIAGNOSIS — M54.50 LUMBAR BACK PAIN: ICD-10-CM

## 2022-03-29 DIAGNOSIS — G89.29 CHRONIC PAIN OF LEFT KNEE: ICD-10-CM

## 2022-03-29 RX ORDER — TRAMADOL HYDROCHLORIDE 50 MG/1
50 TABLET ORAL EVERY 6 HOURS PRN
Qty: 60 TABLET | Refills: 1 | Status: SHIPPED | OUTPATIENT
Start: 2022-03-29 | End: 2022-04-25 | Stop reason: SDUPTHER

## 2022-04-05 ENCOUNTER — TELEPHONE (OUTPATIENT)
Dept: FAMILY MEDICINE CLINIC | Facility: CLINIC | Age: 57
End: 2022-04-05

## 2022-04-05 DIAGNOSIS — Z01.812 ENCOUNTER FOR PREOPERATIVE SCREENING LABORATORY TESTING FOR COVID-19 VIRUS: Primary | ICD-10-CM

## 2022-04-05 DIAGNOSIS — Z20.822 ENCOUNTER FOR PREOPERATIVE SCREENING LABORATORY TESTING FOR COVID-19 VIRUS: Primary | ICD-10-CM

## 2022-04-05 NOTE — TELEPHONE ENCOUNTER
Please call, i placed order for test here. Should get on Monday 4/11 so we can get it back on time. If gets after this date, may not have in time for surg.

## 2022-04-05 NOTE — TELEPHONE ENCOUNTER
Caller: Jordyn Machado    Relationship: Self    Best call back number: [phone]     What orders are you requesting (i.e. lab or imaging): COVID TEST  In what timeframe would the patient need to come in:  PRIOR TO SURGERY ON 04/15/2022    Where will you receive your lab/imaging services: AT THE PRACTICE     Additional notes: THE REPORTS SHE IS NOT ILL BUT IS REQUIRED TO GET A COVID TEST PRIOR SURGERY ON 04/15/2022.  PLEASE CALL THE PATIENT AND ADVISE [phone]

## 2022-04-05 NOTE — TELEPHONE ENCOUNTER
Please call.  What surgery is she having?  Who is the surgeon?  Did they not order this for her to get done prior to surgery?  The test that we do can take 2 to 3 days

## 2022-04-18 DIAGNOSIS — G89.29 CHRONIC PAIN OF LEFT KNEE: ICD-10-CM

## 2022-04-18 DIAGNOSIS — G62.9 NEUROPATHY: ICD-10-CM

## 2022-04-18 DIAGNOSIS — M25.562 CHRONIC PAIN OF LEFT KNEE: ICD-10-CM

## 2022-04-18 RX ORDER — GABAPENTIN 600 MG/1
TABLET ORAL
Qty: 90 TABLET | Refills: 1 | Status: SHIPPED | OUTPATIENT
Start: 2022-04-18 | End: 2022-06-13 | Stop reason: SDUPTHER

## 2022-04-25 DIAGNOSIS — G89.29 CHRONIC PAIN OF RIGHT KNEE: ICD-10-CM

## 2022-04-25 DIAGNOSIS — M25.561 CHRONIC PAIN OF RIGHT KNEE: ICD-10-CM

## 2022-04-25 DIAGNOSIS — M25.562 CHRONIC PAIN OF LEFT KNEE: ICD-10-CM

## 2022-04-25 DIAGNOSIS — M54.50 LUMBAR BACK PAIN: ICD-10-CM

## 2022-04-25 DIAGNOSIS — G89.29 CHRONIC PAIN OF LEFT KNEE: ICD-10-CM

## 2022-04-26 RX ORDER — TRAMADOL HYDROCHLORIDE 50 MG/1
50 TABLET ORAL EVERY 6 HOURS PRN
Qty: 60 TABLET | Refills: 0 | Status: SHIPPED | OUTPATIENT
Start: 2022-04-26 | End: 2022-05-09 | Stop reason: SDUPTHER

## 2022-05-09 DIAGNOSIS — M25.561 CHRONIC PAIN OF RIGHT KNEE: ICD-10-CM

## 2022-05-09 DIAGNOSIS — G89.29 CHRONIC PAIN OF RIGHT KNEE: ICD-10-CM

## 2022-05-09 DIAGNOSIS — G89.29 CHRONIC PAIN OF LEFT KNEE: ICD-10-CM

## 2022-05-09 DIAGNOSIS — M54.50 LUMBAR BACK PAIN: ICD-10-CM

## 2022-05-09 DIAGNOSIS — M25.562 CHRONIC PAIN OF LEFT KNEE: ICD-10-CM

## 2022-05-10 RX ORDER — TRAMADOL HYDROCHLORIDE 50 MG/1
50 TABLET ORAL EVERY 6 HOURS PRN
Qty: 60 TABLET | Refills: 3 | Status: SHIPPED | OUTPATIENT
Start: 2022-05-10 | End: 2022-07-04 | Stop reason: SDUPTHER

## 2022-05-12 ENCOUNTER — OFFICE VISIT (OUTPATIENT)
Dept: FAMILY MEDICINE CLINIC | Facility: CLINIC | Age: 57
End: 2022-05-12

## 2022-05-12 VITALS
WEIGHT: 262 LBS | BODY MASS INDEX: 42.11 KG/M2 | HEART RATE: 88 BPM | TEMPERATURE: 98.4 F | DIASTOLIC BLOOD PRESSURE: 92 MMHG | HEIGHT: 66 IN | RESPIRATION RATE: 18 BRPM | SYSTOLIC BLOOD PRESSURE: 152 MMHG

## 2022-05-12 DIAGNOSIS — M54.50 LUMBAR BACK PAIN: ICD-10-CM

## 2022-05-12 DIAGNOSIS — I10 ESSENTIAL HYPERTENSION: ICD-10-CM

## 2022-05-12 DIAGNOSIS — G89.29 CHRONIC PAIN OF RIGHT KNEE: ICD-10-CM

## 2022-05-12 DIAGNOSIS — M25.561 CHRONIC PAIN OF RIGHT KNEE: ICD-10-CM

## 2022-05-12 DIAGNOSIS — G89.29 CHRONIC PAIN OF LEFT KNEE: Primary | ICD-10-CM

## 2022-05-12 DIAGNOSIS — M25.552 LEFT HIP PAIN: ICD-10-CM

## 2022-05-12 DIAGNOSIS — M25.562 CHRONIC PAIN OF LEFT KNEE: Primary | ICD-10-CM

## 2022-05-12 PROCEDURE — 99214 OFFICE O/P EST MOD 30 MIN: CPT | Performed by: FAMILY MEDICINE

## 2022-05-12 NOTE — PROGRESS NOTES
"Chief Complaint  Knee Pain (F/u )    Subjective          Jordyn Macahdo presents to [facility]  History of Present Illness     The patient presents today for a follow-up.    Chronic pain of left knee, right knee, lumbar back, and left hip  The patient states that she saw a podiatrist, and they realigned the toe bone. The patient will hopefully see the podiatrist again next week. She feels like it is getting better. She states that she is still taking tramadol for her both knees, back, and left hip. She states that the tramadol does help the pain and she denies any side effects.    Hypertension  She states that she is still taking Lisinopril-hydrochlorothiazide 20-12.5 mg daily for her blood pressure every morning. Blood pressure today is 152/92 mmHg. She denies any chest pain, dyspnea, or headaches. She states that she does not check her blood pressure at home. She denies any lightheadedness.    Cervical cancer screening.  She states that she had her Pap smear done on 01/05/2022.    Review of Systems   Constitutional: Negative.    HENT: Negative.    Respiratory: Negative.  Negative for shortness of breath.    Cardiovascular: Negative.  Negative for chest pain.   Gastrointestinal: Negative.    Musculoskeletal: Positive for arthralgias and back pain.        Objective       Vital Signs:   /92   Pulse 88   Temp 98.4 °F (36.9 °C)   Resp 18   Ht 167.6 cm (66\")   Wt 119 kg (262 lb) Comment: patient has boot on  BMI 42.29 kg/m²     Physical Exam  Vitals and nursing note reviewed.   Constitutional:       Appearance: She is well-developed.   HENT:      Head: Normocephalic and atraumatic.      Right Ear: Hearing and external ear normal.      Left Ear: Hearing and external ear normal.   Eyes:      Conjunctiva/sclera: Conjunctivae normal.      Pupils: Pupils are equal, round, and reactive to light.   Cardiovascular:      Rate and Rhythm: Normal rate and regular rhythm.      " Heart sounds: Normal heart sounds. No murmur heard.    No friction rub.   Pulmonary:      Effort: Pulmonary effort is normal. No respiratory distress.      Breath sounds: Normal breath sounds. No wheezing or rales.   Musculoskeletal:      Comments: Tenderness low back. SLR neg bilateral  Pain medial and lateral knees bilaterally     Skin:     General: Skin is warm.   Neurological:      Mental Status: She is alert.   Psychiatric:         Behavior: Behavior normal.          Result Review :                     Assessment and Plan    Diagnoses and all orders for this visit:    1. Chronic pain of left knee (Primary)    2. Chronic pain of right knee    3. Lumbar back pain    4. Left hip pain    5. Essential hypertension    1. Chronic pain of left knee, right knee, lumbar back, and left hip  She is currently controlled on tramadol 50 mg every 6 hours as needed for pain. This was sent in on 05/10/2022. No evidence of misuse or diversion. Taking medication appropriately.    2. Hypertension  Blood pressure is mildly increased here in the office today. She is not checking it at home. Recommend that she stop here in the office and get that checked with a nurse visit. Continue Lisinopril-hydrochlorothiazide 20-12.5 mg once a day. She had blood work done in 11/2021, which was reviewed including CMP, CBC, and lipids, which were not concerning. Follow up and recheck in 3 months.        DISCUSSION      Cristiano dated 5/12/2022  was reviewed and appropriate.     Follow Up   Return in about 3 months (around 8/12/2022).    Patient was given instructions and counseling regarding her condition or for health maintenance advice. Please see specific information pulled into the AVS if appropriate.       Dl Lucas MD     Transcribed from ambient dictation for Dl Luacs MD by LEANNE MORGAN.  05/12/22   17:30 EDT    Patient verbalized consent to the visit recording.  I have personally performed the services described in this  document as transcribed by the above individual, and it is both accurate and complete.  Dl Lucas MD  5/12/2022  18:34 EDT

## 2022-05-22 DIAGNOSIS — M54.50 LUMBAR BACK PAIN: ICD-10-CM

## 2022-05-22 DIAGNOSIS — G89.29 CHRONIC PAIN OF LEFT KNEE: ICD-10-CM

## 2022-05-22 DIAGNOSIS — G89.29 CHRONIC PAIN OF RIGHT KNEE: ICD-10-CM

## 2022-05-22 DIAGNOSIS — M25.561 CHRONIC PAIN OF RIGHT KNEE: ICD-10-CM

## 2022-05-22 DIAGNOSIS — M25.562 CHRONIC PAIN OF LEFT KNEE: ICD-10-CM

## 2022-05-23 RX ORDER — MELOXICAM 15 MG/1
TABLET ORAL
Qty: 30 TABLET | Refills: 2 | Status: SHIPPED | OUTPATIENT
Start: 2022-05-23 | End: 2022-08-29

## 2022-05-23 RX ORDER — TIZANIDINE 4 MG/1
TABLET ORAL
Qty: 60 TABLET | Refills: 2 | Status: SHIPPED | OUTPATIENT
Start: 2022-05-23 | End: 2022-08-16

## 2022-06-13 DIAGNOSIS — G62.9 NEUROPATHY: ICD-10-CM

## 2022-06-13 DIAGNOSIS — M25.562 CHRONIC PAIN OF LEFT KNEE: ICD-10-CM

## 2022-06-13 DIAGNOSIS — G89.29 CHRONIC PAIN OF LEFT KNEE: ICD-10-CM

## 2022-06-14 RX ORDER — GABAPENTIN 600 MG/1
600 TABLET ORAL 3 TIMES DAILY
Qty: 90 TABLET | Refills: 1 | Status: SHIPPED | OUTPATIENT
Start: 2022-06-14 | End: 2022-08-08 | Stop reason: SDUPTHER

## 2022-07-04 DIAGNOSIS — M54.50 LUMBAR BACK PAIN: ICD-10-CM

## 2022-07-04 DIAGNOSIS — M25.561 CHRONIC PAIN OF RIGHT KNEE: ICD-10-CM

## 2022-07-04 DIAGNOSIS — G89.29 CHRONIC PAIN OF RIGHT KNEE: ICD-10-CM

## 2022-07-04 DIAGNOSIS — G89.29 CHRONIC PAIN OF LEFT KNEE: ICD-10-CM

## 2022-07-04 DIAGNOSIS — M25.562 CHRONIC PAIN OF LEFT KNEE: ICD-10-CM

## 2022-07-05 RX ORDER — TRAMADOL HYDROCHLORIDE 50 MG/1
50 TABLET ORAL EVERY 6 HOURS PRN
Qty: 60 TABLET | Refills: 3 | Status: SHIPPED | OUTPATIENT
Start: 2022-07-05 | End: 2022-08-29

## 2022-07-26 RX ORDER — LISINOPRIL AND HYDROCHLOROTHIAZIDE 20; 12.5 MG/1; MG/1
TABLET ORAL
Qty: 90 TABLET | Refills: 1 | Status: SHIPPED | OUTPATIENT
Start: 2022-07-26 | End: 2023-01-27 | Stop reason: SDUPTHER

## 2022-08-08 ENCOUNTER — TELEPHONE (OUTPATIENT)
Dept: FAMILY MEDICINE CLINIC | Facility: CLINIC | Age: 57
End: 2022-08-08

## 2022-08-08 DIAGNOSIS — M25.562 CHRONIC PAIN OF LEFT KNEE: ICD-10-CM

## 2022-08-08 DIAGNOSIS — G89.29 CHRONIC PAIN OF LEFT KNEE: ICD-10-CM

## 2022-08-08 DIAGNOSIS — G62.9 NEUROPATHY: ICD-10-CM

## 2022-08-09 ENCOUNTER — TELEPHONE (OUTPATIENT)
Dept: ORTHOPEDIC SURGERY | Facility: CLINIC | Age: 57
End: 2022-08-09

## 2022-08-09 NOTE — TELEPHONE ENCOUNTER
Called patient in regards of message needing to schedule a FU for pain in knees and hip, LVM to call so we can schedule her a appointment

## 2022-08-10 RX ORDER — GABAPENTIN 600 MG/1
600 TABLET ORAL 3 TIMES DAILY
Qty: 90 TABLET | Refills: 1 | Status: SHIPPED | OUTPATIENT
Start: 2022-08-10 | End: 2022-08-12 | Stop reason: SDUPTHER

## 2022-08-12 ENCOUNTER — OFFICE VISIT (OUTPATIENT)
Dept: FAMILY MEDICINE CLINIC | Facility: CLINIC | Age: 57
End: 2022-08-12

## 2022-08-12 VITALS
HEIGHT: 66 IN | OXYGEN SATURATION: 100 % | RESPIRATION RATE: 18 BRPM | HEART RATE: 100 BPM | TEMPERATURE: 97.5 F | WEIGHT: 262 LBS | BODY MASS INDEX: 42.11 KG/M2 | DIASTOLIC BLOOD PRESSURE: 96 MMHG | SYSTOLIC BLOOD PRESSURE: 144 MMHG

## 2022-08-12 DIAGNOSIS — G89.29 CHRONIC PAIN OF LEFT KNEE: Primary | ICD-10-CM

## 2022-08-12 DIAGNOSIS — M25.562 CHRONIC PAIN OF LEFT KNEE: Primary | ICD-10-CM

## 2022-08-12 DIAGNOSIS — Z12.31 SCREENING MAMMOGRAM FOR BREAST CANCER: ICD-10-CM

## 2022-08-12 DIAGNOSIS — G62.9 NEUROPATHY: ICD-10-CM

## 2022-08-12 PROCEDURE — 99213 OFFICE O/P EST LOW 20 MIN: CPT | Performed by: FAMILY MEDICINE

## 2022-08-12 RX ORDER — GABAPENTIN 600 MG/1
600 TABLET ORAL 3 TIMES DAILY
Qty: 90 TABLET | Refills: 1 | Status: SHIPPED | OUTPATIENT
Start: 2022-08-12 | End: 2022-10-04 | Stop reason: SDUPTHER

## 2022-08-15 DIAGNOSIS — G89.29 CHRONIC PAIN OF LEFT KNEE: ICD-10-CM

## 2022-08-15 DIAGNOSIS — M25.562 CHRONIC PAIN OF LEFT KNEE: ICD-10-CM

## 2022-08-15 DIAGNOSIS — M54.50 LUMBAR BACK PAIN: ICD-10-CM

## 2022-08-15 DIAGNOSIS — M25.561 CHRONIC PAIN OF RIGHT KNEE: ICD-10-CM

## 2022-08-15 DIAGNOSIS — G89.29 CHRONIC PAIN OF RIGHT KNEE: ICD-10-CM

## 2022-08-16 RX ORDER — TIZANIDINE 4 MG/1
TABLET ORAL
Qty: 60 TABLET | Refills: 2 | Status: SHIPPED | OUTPATIENT
Start: 2022-08-16 | End: 2022-12-06

## 2022-08-27 DIAGNOSIS — M25.562 CHRONIC PAIN OF LEFT KNEE: ICD-10-CM

## 2022-08-27 DIAGNOSIS — G89.29 CHRONIC PAIN OF RIGHT KNEE: ICD-10-CM

## 2022-08-27 DIAGNOSIS — M54.50 LUMBAR BACK PAIN: ICD-10-CM

## 2022-08-27 DIAGNOSIS — M25.561 CHRONIC PAIN OF RIGHT KNEE: ICD-10-CM

## 2022-08-27 DIAGNOSIS — G89.29 CHRONIC PAIN OF LEFT KNEE: ICD-10-CM

## 2022-08-29 RX ORDER — TRAMADOL HYDROCHLORIDE 50 MG/1
TABLET ORAL
Qty: 60 TABLET | Refills: 1 | Status: SHIPPED | OUTPATIENT
Start: 2022-08-29 | End: 2022-09-25 | Stop reason: SDUPTHER

## 2022-08-29 RX ORDER — MELOXICAM 15 MG/1
TABLET ORAL
Qty: 30 TABLET | Refills: 2 | Status: SHIPPED | OUTPATIENT
Start: 2022-08-29 | End: 2022-11-21 | Stop reason: SDUPTHER

## 2022-09-08 ENCOUNTER — TELEPHONE (OUTPATIENT)
Dept: ORTHOPEDIC SURGERY | Facility: CLINIC | Age: 57
End: 2022-09-08

## 2022-09-08 NOTE — TELEPHONE ENCOUNTER
Last appointment was 3/16/22.   Do you need to see patient or can you place referral?    Maida Gregorio

## 2022-09-08 NOTE — TELEPHONE ENCOUNTER
She needs to come see [facility]. She has not had x-rays since 2020 and hasn't been seen for over a year.

## 2022-09-08 NOTE — TELEPHONE ENCOUNTER
----- Message from Jordyn Machado sent at 9/7/2022  6:58 PM EDT -----  Regarding: Hip injection   I was wondering if I could get a referral to get a hip injection..  I have gotten them before

## 2022-09-09 ENCOUNTER — TELEPHONE (OUTPATIENT)
Dept: ORTHOPEDIC SURGERY | Facility: CLINIC | Age: 57
End: 2022-09-09

## 2022-09-12 NOTE — TELEPHONE ENCOUNTER
CALLED PATIENT TO SCHEDULE AN APPOINTMENT PER MYCHART REQUEST. LEFT VOICEMAIL.   
Called patient and left a message to schedule follow up with Dr. Corrigan for Hip and Knee. Patient would like X-rays.  
oral

## 2022-09-16 ENCOUNTER — OFFICE VISIT (OUTPATIENT)
Dept: ORTHOPEDIC SURGERY | Facility: CLINIC | Age: 57
End: 2022-09-16

## 2022-09-16 VITALS
HEIGHT: 66 IN | BODY MASS INDEX: 41.78 KG/M2 | WEIGHT: 260 LBS | DIASTOLIC BLOOD PRESSURE: 88 MMHG | SYSTOLIC BLOOD PRESSURE: 135 MMHG

## 2022-09-16 DIAGNOSIS — M17.11 PRIMARY OSTEOARTHRITIS OF RIGHT KNEE: ICD-10-CM

## 2022-09-16 DIAGNOSIS — M16.52 POST-TRAUMATIC OSTEOARTHRITIS OF LEFT HIP: Primary | ICD-10-CM

## 2022-09-16 DIAGNOSIS — E66.01 CLASS 3 SEVERE OBESITY DUE TO EXCESS CALORIES WITHOUT SERIOUS COMORBIDITY WITH BODY MASS INDEX (BMI) OF 40.0 TO 44.9 IN ADULT: ICD-10-CM

## 2022-09-16 PROCEDURE — 99214 OFFICE O/P EST MOD 30 MIN: CPT | Performed by: ORTHOPAEDIC SURGERY

## 2022-09-16 PROCEDURE — 20610 DRAIN/INJ JOINT/BURSA W/O US: CPT | Performed by: ORTHOPAEDIC SURGERY

## 2022-09-16 RX ORDER — LIDOCAINE HYDROCHLORIDE 10 MG/ML
3 INJECTION, SOLUTION EPIDURAL; INFILTRATION; INTRACAUDAL; PERINEURAL
Status: COMPLETED | OUTPATIENT
Start: 2022-09-16 | End: 2022-09-16

## 2022-09-16 RX ORDER — BUPIVACAINE HYDROCHLORIDE 2.5 MG/ML
3 INJECTION, SOLUTION EPIDURAL; INFILTRATION; INTRACAUDAL
Status: COMPLETED | OUTPATIENT
Start: 2022-09-16 | End: 2022-09-16

## 2022-09-16 RX ORDER — TRIAMCINOLONE ACETONIDE 40 MG/ML
80 INJECTION, SUSPENSION INTRA-ARTICULAR; INTRAMUSCULAR
Status: COMPLETED | OUTPATIENT
Start: 2022-09-16 | End: 2022-09-16

## 2022-09-16 RX ADMIN — BUPIVACAINE HYDROCHLORIDE 3 ML: 2.5 INJECTION, SOLUTION EPIDURAL; INFILTRATION; INTRACAUDAL at 12:05

## 2022-09-16 RX ADMIN — LIDOCAINE HYDROCHLORIDE 3 ML: 10 INJECTION, SOLUTION EPIDURAL; INFILTRATION; INTRACAUDAL; PERINEURAL at 12:05

## 2022-09-16 RX ADMIN — TRIAMCINOLONE ACETONIDE 80 MG: 40 INJECTION, SUSPENSION INTRA-ARTICULAR; INTRAMUSCULAR at 12:05

## 2022-09-16 NOTE — PROGRESS NOTES
Procedure   Large Joint Arthrocentesis: R knee  Date/Time: 9/16/2022 12:05 PM  Consent given by: patient  Site marked: site marked  Timeout: Immediately prior to procedure a time out was called to verify the correct patient, procedure, equipment, support staff and site/side marked as required   Supporting Documentation  Indications: pain   Procedure Details  Location: knee - R knee  Preparation: Patient was prepped and draped in the usual sterile fashion  Needle size: 22 G  Approach: anterolateral  Medications administered: 3 mL bupivacaine (PF) 0.25 %; 3 mL lidocaine PF 1% 1 %; 80 mg triamcinolone acetonide 40 MG/ML  Patient tolerance: patient tolerated the procedure well with no immediate complications

## 2022-09-16 NOTE — PROGRESS NOTES
Procedure   - Large Joint Arthrocentesis: R knee on 9/16/2022 12:04 PM  Indications: pain  Details: 22 G needle, anterolateral approach  Medications: 3 mL bupivacaine (PF) 0.25 %; 3 mL lidocaine PF 1% 1 %; 80 mg triamcinolone acetonide 40 MG/ML  Outcome: tolerated well, no immediate complications  Procedure, treatment alternatives, risks and benefits explained, specific risks discussed. Consent was given by the patient. Immediately prior to procedure a time out was called to verify the correct patient, procedure, equipment, support staff and site/side marked as required. Patient was prepped and draped in the usual sterile fashion.

## 2022-09-16 NOTE — PROGRESS NOTES
Orthopaedic Clinic Note: Hip Established Patient    Chief Complaint   Patient presents with   • Follow-up     18 months- Post-traumatic osteoarthritis of left hip, primary osteoarthritis of right knee        HPI    It has been 18  month(s) since Ms. Machado's last visit. She returns to clinic today for follow-up left hip arthritis and right knee arthritis.  She comes clinic today complaining of pain in both joints that she rates as 7/10 on the pain scale.  She is ambulating with no assistive device.  She is overweight with a BMI of 41.99.  Her pain is been refractory to prescription anti-inflammatories.  Overall she is doing about the same.      Past Medical History:   Diagnosis Date   • Anxiety 1 year ago   • Arthritis    • Depression 2 years ago   • Hypertension    • Low back pain 4 years ago    Hip pain both hips an left knee pain..   • Obesity Left toe an foot pain      Past Surgical History:   Procedure Laterality Date   • FRACTURE SURGERY  4/15/22    Left toe Bunionectomy   • HIP SURGERY Left    • KNEE SURGERY Left       Family History   Problem Relation Age of Onset   • Lung cancer Mother    • Arthritis Mother    • Lung cancer Father    • Cancer Father            • Hypertension Sister      Social History     Socioeconomic History   • Marital status: Single   Tobacco Use   • Smoking status: Never Smoker   • Smokeless tobacco: Never Used   Substance and Sexual Activity   • Alcohol use: Never     Alcohol/week: 0.0 standard drinks     Comment: occasional    • Drug use: Never   • Sexual activity: Not Currently     Partners: Male     Birth control/protection: Post-menopausal      Current Outpatient Medications on File Prior to Visit   Medication Sig Dispense Refill   • gabapentin (NEURONTIN) 600 MG tablet Take 1 tablet by mouth 3 (Three) Times a Day. 90 tablet 1   • lisinopril-hydrochlorothiazide (PRINZIDE,ZESTORETIC) 20-12.5 MG per tablet TAKE ONE TABLET BY MOUTH DAILY 90 tablet 1   • meloxicam  "(MOBIC) 15 MG tablet TAKE ONE TABLET BY MOUTH DAILY 30 tablet 2   • tiZANidine (ZANAFLEX) 4 MG tablet TAKE ONE TO TWO TABLETS BY MOUTH ONCE NIGHTLY AS NEEDED FOR MUSCLE SPASMS 60 tablet 2   • traMADol (ULTRAM) 50 MG tablet TAKE ONE TABLET BY MOUTH EVERY 6 HOURS AS NEEDED FOR MODERATE PAIN 60 tablet 1     No current facility-administered medications on file prior to visit.      No Known Allergies     Review of Systems   Constitutional: Negative.    HENT: Negative.    Eyes: Negative.    Respiratory: Negative.    Cardiovascular: Negative.    Gastrointestinal: Negative.    Endocrine: Negative.    Genitourinary: Negative.    Musculoskeletal: Positive for arthralgias.   Skin: Negative.    Allergic/Immunologic: Negative.    Neurological: Negative.    Hematological: Negative.    Psychiatric/Behavioral: Negative.         The patient's Review of Systems was personally reviewed and confirmed as accurate.    Physical Exam  Blood pressure 135/88, height 167.6 cm (65.98\"), weight 118 kg (260 lb).    Body mass index is 41.99 kg/m².    GENERAL APPEARANCE: awake, alert, oriented, in no acute distress, well developed, well nourished and obese  LUNGS:  breathing nonlabored  EXTREMITIES: no clubbing, cyanosis  PERIPHERAL PULSES: palpable dorsalis pedis and posterior tibial pulses bilaterally.    GAIT:  Antalgic            Right knee exam:  ----------  Hip Exam  ----------  FLEXION CONTRACTURE: None  FLEXION: 110 degrees  INTERNAL ROTATION: 20 degrees at 90 degrees of flexion   EXTERNAL ROTATION: 40 degrees at 90 degrees of flexion    PAIN WITH HIP MOTION: no  ----------  Knee Exam  ----------  ALIGNMENT: neutral, no varus or valgus deformity     RANGE OF MOTION:  Normal (0-120 degrees) with no extensor lag or flexion contracture  LIGAMENTOUS STABILITY:   stable to varus and valgus stress at terminal extension and 30 degrees without any evidence of laxity     STRENGTH:  5/5 knee flexion, extension. 5/5 ankle dorsiflexion and " plantarflexion.     PAIN WITH PALPATION: Global   KNEE EFFUSION: Yes, Mild effusion  PAIN WITH KNEE ROM: Yes, anteriorly  PATELLAR CREPITUS: yes, painful and symptomatic  SPECIAL EXAM FINDINGS:   Positive patellar compression    REFLEXES:  PATELLAR 2+/4  ACHILLES 2+/4    CLONUS: negative  STRAIGHT LEG TEST:   negative     SENSATION TO LIGHT TOUCH:  DEEP PERONEAL/SUPERFICIAL PERONEAL/SURAL/SAPHENOUS/TIBIAL:   intact     EDEMA:  no  ERYTHEMA:  no  WOUNDS/INCISIONS: no overlying skin problems.        Left Hip Exam:   RANGE OF MOTION:   FLEXION CONTRACTURE: None   FLEXION: 110 degrees   INTERNAL ROTATION: 20 degrees at 90 degrees of flexion   EXTERNAL ROTATION: 40 degrees at 90 degrees of flexion    PAIN WITH HIP MOTION: Yes, mild pain with terminal internal rotation of hip localized to groin  PAIN WITH LOGROLL: no  STINCHFIELD TEST: negative     KNEE EXAM: full knee ROM (0-120 degrees), stable to varus and valgus stress at terminal extension and 30 degrees flexion      STRENGTH:  5/5 hip adduction, abduction, flexion. 5/5 strength knee flexion, extension. 5/5 strength ankle dorsiflexion and plantarflexion.      _________________________________________________________________  _________________________________________________________________    RADIOGRAPHIC FINDINGS:   Indication: Right knee and left hip pain    Comparison: Todays xrays were compared to previous xrays from 11/5/2020    AP pelvis: Right: mild joint space narrowing, minimal osteophyte formation and No significant changes compared to prior radiographs.;Left: mild joint space narrowing, there are marginal osteophytes visualized at the femoral head-neck junction and acetabular margins and No significant changes compared to prior radiographs.    Right knee 4 views: Moderate medial lateral compartment arthritis with neutral alignment.  Severe patellofemoral arthritis with bone-on-bone articulation.  Small periarticular osteophytes visualized in all  compartments.  No significant changes compared to prior radiographs.    Assessment/Plan:   Diagnosis Plan   1. Post-traumatic osteoarthritis of left hip  XR Hip With or Without Pelvis 2 - 3 View Left    FL Guide For Pain Meds Inj   2. Primary osteoarthritis of right knee  XR Knee 4+ View Right    Large Joint Arthrocentesis: R knee    bupivacaine (PF) (MARCAINE) 0.25 % injection 3 mL    lidocaine PF 1% (XYLOCAINE) injection 3 mL    triamcinolone acetonide (KENALOG-40) injection 80 mg   3. Class 3 severe obesity due to excess calories without serious comorbidity with body mass index (BMI) of 40.0 to 44.9 in adult (HCC)       Patient is suffering from osteoarthritis of the right knee as well as left hip.  I discussed treatment options with her.  She is agreeable to a right knee cortisone injection today.  In regards to her left hip, she is agreeable to intra-articular injection as well.  She will follow-up as needed.  I encouraged her to work on weight loss to decrease joint reaction forces.  Ideally, we will I would recommend minimizing the number of injections that she receives as she is still relatively young.    Procedure Note:  I discussed with the patient the potential benefits of performing a therapeutic injection of the right knee as well as potential risks including but not limited to infection, swelling, pain, bleeding, bruising, nerve/vessel damage, skin color changes, transient elevation in blood glucose levels, and fat atrophy. After informed consent and verifying correct patient, procedure site, and type of procedure, the area was prepped with alcohol, ethyl chloride was used to numb the skin. Via the superolateral approach, 3cc of 1% lidocaine, 3 cc of 0.25% Marcaine and 2 cc of 40mg/ml of Kenalog were injected into the right knee. The patient tolerated the procedure well. There were no complications. A sterile dressing was placed over the injection site.      I discussed with the patient the potential  benefits of performing a therapeutic injection of the left hip as well as potential risks including but not limited to infection, swelling, pain, bleeding, bruising, nerve/vessel damage, acceleration of arthritis skin color changes, transient elevation in blood glucose levels, and fat atrophy.      Rodney Corrigan MD  09/26/22  14:00 EDT

## 2022-09-22 DIAGNOSIS — M54.50 LUMBAR BACK PAIN: Primary | ICD-10-CM

## 2022-09-25 DIAGNOSIS — M54.50 LUMBAR BACK PAIN: ICD-10-CM

## 2022-09-25 DIAGNOSIS — M25.562 CHRONIC PAIN OF LEFT KNEE: ICD-10-CM

## 2022-09-25 DIAGNOSIS — M25.561 CHRONIC PAIN OF RIGHT KNEE: ICD-10-CM

## 2022-09-25 DIAGNOSIS — G89.29 CHRONIC PAIN OF LEFT KNEE: ICD-10-CM

## 2022-09-25 DIAGNOSIS — G89.29 CHRONIC PAIN OF RIGHT KNEE: ICD-10-CM

## 2022-09-26 RX ORDER — TRAMADOL HYDROCHLORIDE 50 MG/1
50 TABLET ORAL EVERY 6 HOURS PRN
Qty: 60 TABLET | Refills: 1 | Status: SHIPPED | OUTPATIENT
Start: 2022-09-26 | End: 2022-10-24 | Stop reason: SDUPTHER

## 2022-10-04 DIAGNOSIS — G89.29 CHRONIC PAIN OF LEFT KNEE: ICD-10-CM

## 2022-10-04 DIAGNOSIS — G62.9 NEUROPATHY: ICD-10-CM

## 2022-10-04 DIAGNOSIS — M25.562 CHRONIC PAIN OF LEFT KNEE: ICD-10-CM

## 2022-10-04 RX ORDER — GABAPENTIN 600 MG/1
600 TABLET ORAL 3 TIMES DAILY
Qty: 90 TABLET | Refills: 1 | Status: SHIPPED | OUTPATIENT
Start: 2022-10-04 | End: 2023-01-23 | Stop reason: SDUPTHER

## 2022-10-05 ENCOUNTER — HOSPITAL ENCOUNTER (OUTPATIENT)
Dept: GENERAL RADIOLOGY | Facility: HOSPITAL | Age: 57
Discharge: HOME | End: 2022-10-05
Admitting: FAMILY MEDICINE

## 2022-10-05 DIAGNOSIS — M54.50 LUMBAR BACK PAIN: ICD-10-CM

## 2022-10-05 PROCEDURE — 72110 X-RAY EXAM L-2 SPINE 4/>VWS: CPT

## 2022-10-20 ENCOUNTER — APPOINTMENT (OUTPATIENT)
Dept: GENERAL RADIOLOGY | Facility: HOSPITAL | Age: 57
End: 2022-10-20

## 2022-10-24 DIAGNOSIS — M25.562 CHRONIC PAIN OF LEFT KNEE: ICD-10-CM

## 2022-10-24 DIAGNOSIS — M54.50 LUMBAR BACK PAIN: ICD-10-CM

## 2022-10-24 DIAGNOSIS — G89.29 CHRONIC PAIN OF RIGHT KNEE: ICD-10-CM

## 2022-10-24 DIAGNOSIS — G89.29 CHRONIC PAIN OF LEFT KNEE: ICD-10-CM

## 2022-10-24 DIAGNOSIS — M25.561 CHRONIC PAIN OF RIGHT KNEE: ICD-10-CM

## 2022-10-25 DIAGNOSIS — G89.29 CHRONIC PAIN OF LEFT KNEE: ICD-10-CM

## 2022-10-25 DIAGNOSIS — M25.561 CHRONIC PAIN OF RIGHT KNEE: ICD-10-CM

## 2022-10-25 DIAGNOSIS — M25.562 CHRONIC PAIN OF LEFT KNEE: ICD-10-CM

## 2022-10-25 DIAGNOSIS — M54.50 LUMBAR BACK PAIN: ICD-10-CM

## 2022-10-25 DIAGNOSIS — G89.29 CHRONIC PAIN OF RIGHT KNEE: ICD-10-CM

## 2022-10-25 RX ORDER — TRAMADOL HYDROCHLORIDE 50 MG/1
50 TABLET ORAL EVERY 6 HOURS PRN
Qty: 60 TABLET | Refills: 1 | Status: SHIPPED | OUTPATIENT
Start: 2022-10-25 | End: 2022-11-20 | Stop reason: SDUPTHER

## 2022-10-25 RX ORDER — TRAMADOL HYDROCHLORIDE 50 MG/1
50 TABLET ORAL EVERY 6 HOURS PRN
Qty: 60 TABLET | Refills: 1 | Status: CANCELLED | OUTPATIENT
Start: 2022-10-25

## 2022-11-18 ENCOUNTER — OFFICE VISIT (OUTPATIENT)
Dept: FAMILY MEDICINE CLINIC | Facility: CLINIC | Age: 57
End: 2022-11-18

## 2022-11-18 VITALS
SYSTOLIC BLOOD PRESSURE: 130 MMHG | HEIGHT: 65 IN | DIASTOLIC BLOOD PRESSURE: 98 MMHG | TEMPERATURE: 97.5 F | BODY MASS INDEX: 42.99 KG/M2 | HEART RATE: 89 BPM | RESPIRATION RATE: 18 BRPM | OXYGEN SATURATION: 98 % | WEIGHT: 258 LBS

## 2022-11-18 DIAGNOSIS — R20.0 BILATERAL LEG NUMBNESS: ICD-10-CM

## 2022-11-18 DIAGNOSIS — R29.898 BILATERAL LEG WEAKNESS: ICD-10-CM

## 2022-11-18 DIAGNOSIS — G95.19 NEUROGENIC CLAUDICATION: Primary | ICD-10-CM

## 2022-11-18 PROCEDURE — 99213 OFFICE O/P EST LOW 20 MIN: CPT | Performed by: FAMILY MEDICINE

## 2022-11-18 NOTE — PROGRESS NOTES
"Chief Complaint  Back Pain (Weakness in legs  started in April when she stands for a while.)    Subjective          Jordyn Machado presents to [facility]  History of Present Illness    Bilatrael lower extremity weakness  The patient presents today stating that she has been experiencing bilateral lower extremity weakness. She states that the weakness is interfering with her being able to stand for long periods of time around the house. She states that she is experiencing the weakness within the upper region of her bilateral lower extremities. She states that she has been experiencing numbness and tingling within her left foot and toes for a while. She states that the weakness has worsened post foot surgery. She states that she was wearing a boot on her left lower extremity for approximately 1 week. She states that she has been ambulating with a limp. She states that she has to lean on a shopping cart while shopping to help alleviate the numbness she experiences within her bilateral lower extremities. She states that she has obtained an MRI of her lower extremity previously. She denies having any metal within her body.    Back pain  She states that she has been experiencing pain in the middle of her back on the left side. She denies having much back pain that it is mainly within her hip region. The patient obtained a radiograph in 10/2022 and it showed that she had degenerative disc disease and arthritis.    Health maintenance  She denies receiving a flu vaccination this year. She states that she does not want to receive the COVID-19 vaccination. She denies receiving a mammogram.    Review of Systems   All other systems reviewed and are negative.       Objective       Vital Signs:   /98   Pulse 89   Temp 97.5 °F (36.4 °C)   Resp 18   Ht 165.1 cm (65\")   Wt 117 kg (258 lb)   SpO2 98%   BMI 42.93 kg/m²     Physical Exam  Vitals and nursing note reviewed. "   Constitutional:       Appearance: She is well-developed.   HENT:      Head: Normocephalic and atraumatic.      Right Ear: Hearing and external ear normal.      Left Ear: Hearing and external ear normal.   Eyes:      Conjunctiva/sclera: Conjunctivae normal.      Pupils: Pupils are equal, round, and reactive to light.   Pulmonary:      Effort: Pulmonary effort is normal.   Musculoskeletal:      Comments: Antalgic gait.  No significant tenderness of the lumbar spine.  Straight leg raising is negative.  Reflexes are 1+/4 at the knee.   Skin:     General: Skin is warm.   Neurological:      Mental Status: She is alert.   Psychiatric:         Behavior: Behavior normal.          Result Review :                     Assessment and Plan    Diagnoses and all orders for this visit:    1. Neurogenic claudication (HCC) (Primary)  -     MRI Brain Without Contrast; Future    2. Bilateral leg numbness  -     MRI Brain Without Contrast; Future    3. Bilateral leg weakness  -     MRI Brain Without Contrast; Future          1. Neurogenic claudication  I will order an MRI of the lumbar spine.    DISCUSSION    Given her symptoms of weakness, numbness in the lower extremities with walking and standing with resolution of the symptoms with sitting and leaning over, suspect she has neurogenic claudication.  Recommend MRI of the lumbar spine.  X-ray showed significant degenerative changes in October 2022.  We will get her set up for MRI.  Further plan once that is reviewed.  She agrees.    Cristiano dated 11/20/2022  was reviewed and appropriate.     Follow Up   Return for follow up depends on review of labs and testing.    Patient was given instructions and counseling regarding her condition or for health maintenance advice. Please see specific information pulled into the AVS if appropriate.       Dl Lucas MD     Transcribed from ambient dictation for Dl Lucas MD by Virginia Oneill.  11/18/22   17:55 EST    Patient or patient  representative verbalized consent to the visit recording.  I have personally performed the services described in this document as transcribed by the above individual, and it is both accurate and complete.  Dl Lucas MD  11/20/2022  12:40 EST

## 2022-11-20 DIAGNOSIS — M25.562 CHRONIC PAIN OF LEFT KNEE: ICD-10-CM

## 2022-11-20 DIAGNOSIS — G89.29 CHRONIC PAIN OF LEFT KNEE: ICD-10-CM

## 2022-11-20 DIAGNOSIS — M25.561 CHRONIC PAIN OF RIGHT KNEE: ICD-10-CM

## 2022-11-20 DIAGNOSIS — M54.50 LUMBAR BACK PAIN: ICD-10-CM

## 2022-11-20 DIAGNOSIS — G89.29 CHRONIC PAIN OF RIGHT KNEE: ICD-10-CM

## 2022-11-21 RX ORDER — TRAMADOL HYDROCHLORIDE 50 MG/1
50 TABLET ORAL EVERY 6 HOURS PRN
Qty: 60 TABLET | Refills: 2 | Status: SHIPPED | OUTPATIENT
Start: 2022-11-21 | End: 2023-01-02 | Stop reason: SDUPTHER

## 2022-11-21 RX ORDER — MELOXICAM 15 MG/1
15 TABLET ORAL DAILY
Qty: 30 TABLET | Refills: 2 | Status: SHIPPED | OUTPATIENT
Start: 2022-11-21 | End: 2023-01-27 | Stop reason: SDUPTHER

## 2022-11-21 NOTE — TELEPHONE ENCOUNTER
Rx Refill Note  Requested Prescriptions     Pending Prescriptions Disp Refills   • traMADol (ULTRAM) 50 MG tablet 60 tablet 1     Sig: Take 1 tablet by mouth Every 6 (Six) Hours As Needed for Moderate Pain.      Last office visit with prescribing clinician: 11/18/2022      Next office visit with prescribing clinician: Visit date not found            Jojo Diez MA  11/21/22, 07:39 EST

## 2022-12-05 DIAGNOSIS — M25.561 CHRONIC PAIN OF RIGHT KNEE: ICD-10-CM

## 2022-12-05 DIAGNOSIS — M54.50 LUMBAR BACK PAIN: ICD-10-CM

## 2022-12-05 DIAGNOSIS — M25.562 CHRONIC PAIN OF LEFT KNEE: ICD-10-CM

## 2022-12-05 DIAGNOSIS — G89.29 CHRONIC PAIN OF LEFT KNEE: ICD-10-CM

## 2022-12-05 DIAGNOSIS — G89.29 CHRONIC PAIN OF RIGHT KNEE: ICD-10-CM

## 2022-12-06 RX ORDER — TIZANIDINE 4 MG/1
TABLET ORAL
Qty: 60 TABLET | Refills: 2 | Status: SHIPPED | OUTPATIENT
Start: 2022-12-06

## 2023-01-02 ENCOUNTER — TELEPHONE (OUTPATIENT)
Dept: FAMILY MEDICINE CLINIC | Facility: CLINIC | Age: 58
End: 2023-01-02
Payer: MEDICARE

## 2023-01-02 DIAGNOSIS — M54.50 LUMBAR BACK PAIN: ICD-10-CM

## 2023-01-02 DIAGNOSIS — M25.561 CHRONIC PAIN OF RIGHT KNEE: ICD-10-CM

## 2023-01-02 DIAGNOSIS — G89.29 CHRONIC PAIN OF RIGHT KNEE: ICD-10-CM

## 2023-01-02 DIAGNOSIS — M25.562 CHRONIC PAIN OF LEFT KNEE: ICD-10-CM

## 2023-01-02 DIAGNOSIS — G89.29 CHRONIC PAIN OF LEFT KNEE: ICD-10-CM

## 2023-01-03 DIAGNOSIS — G89.29 CHRONIC PAIN OF LEFT KNEE: ICD-10-CM

## 2023-01-03 DIAGNOSIS — M25.561 CHRONIC PAIN OF RIGHT KNEE: ICD-10-CM

## 2023-01-03 DIAGNOSIS — M25.562 CHRONIC PAIN OF LEFT KNEE: ICD-10-CM

## 2023-01-03 DIAGNOSIS — G89.29 CHRONIC PAIN OF RIGHT KNEE: ICD-10-CM

## 2023-01-03 DIAGNOSIS — M54.50 LUMBAR BACK PAIN: ICD-10-CM

## 2023-01-03 RX ORDER — TRAMADOL HYDROCHLORIDE 50 MG/1
50 TABLET ORAL EVERY 6 HOURS PRN
Qty: 60 TABLET | Refills: 2 | Status: CANCELLED | OUTPATIENT
Start: 2023-01-03

## 2023-01-03 RX ORDER — TRAMADOL HYDROCHLORIDE 50 MG/1
50 TABLET ORAL EVERY 6 HOURS PRN
Qty: 60 TABLET | Refills: 2 | Status: SHIPPED | OUTPATIENT
Start: 2023-01-03 | End: 2023-02-13 | Stop reason: SDUPTHER

## 2023-01-03 NOTE — TELEPHONE ENCOUNTER
Please call, requested meds sent to pharmacy. Due for office visit.       ==============  Cristiano dated 1/3/2023 was reviewed and appropriate.

## 2023-01-23 DIAGNOSIS — M25.562 CHRONIC PAIN OF LEFT KNEE: ICD-10-CM

## 2023-01-23 DIAGNOSIS — G89.29 CHRONIC PAIN OF LEFT KNEE: ICD-10-CM

## 2023-01-23 DIAGNOSIS — G62.9 NEUROPATHY: ICD-10-CM

## 2023-01-24 DIAGNOSIS — M25.562 CHRONIC PAIN OF LEFT KNEE: ICD-10-CM

## 2023-01-24 DIAGNOSIS — G62.9 NEUROPATHY: ICD-10-CM

## 2023-01-24 DIAGNOSIS — G89.29 CHRONIC PAIN OF LEFT KNEE: ICD-10-CM

## 2023-01-24 RX ORDER — GABAPENTIN 600 MG/1
600 TABLET ORAL 3 TIMES DAILY
Qty: 90 TABLET | Refills: 1 | Status: CANCELLED | OUTPATIENT
Start: 2023-01-24

## 2023-01-25 RX ORDER — GABAPENTIN 600 MG/1
600 TABLET ORAL 3 TIMES DAILY
Qty: 90 TABLET | Refills: 1 | Status: SHIPPED | OUTPATIENT
Start: 2023-01-25 | End: 2023-02-20 | Stop reason: SDUPTHER

## 2023-01-27 RX ORDER — LISINOPRIL AND HYDROCHLOROTHIAZIDE 20; 12.5 MG/1; MG/1
1 TABLET ORAL DAILY
Qty: 90 TABLET | Refills: 1 | Status: SHIPPED | OUTPATIENT
Start: 2023-01-27

## 2023-01-27 RX ORDER — MELOXICAM 15 MG/1
15 TABLET ORAL DAILY
Qty: 30 TABLET | Refills: 2 | Status: SHIPPED | OUTPATIENT
Start: 2023-01-27

## 2023-02-13 DIAGNOSIS — M25.562 CHRONIC PAIN OF LEFT KNEE: ICD-10-CM

## 2023-02-13 DIAGNOSIS — G89.29 CHRONIC PAIN OF RIGHT KNEE: ICD-10-CM

## 2023-02-13 DIAGNOSIS — M54.50 LUMBAR BACK PAIN: ICD-10-CM

## 2023-02-13 DIAGNOSIS — M25.561 CHRONIC PAIN OF RIGHT KNEE: ICD-10-CM

## 2023-02-13 DIAGNOSIS — G89.29 CHRONIC PAIN OF LEFT KNEE: ICD-10-CM

## 2023-02-14 RX ORDER — TRAMADOL HYDROCHLORIDE 50 MG/1
50 TABLET ORAL EVERY 6 HOURS PRN
Qty: 60 TABLET | Refills: 2 | Status: SHIPPED | OUTPATIENT
Start: 2023-02-14 | End: 2023-03-26 | Stop reason: SDUPTHER

## 2023-02-20 DIAGNOSIS — M25.562 CHRONIC PAIN OF LEFT KNEE: ICD-10-CM

## 2023-02-20 DIAGNOSIS — G62.9 NEUROPATHY: ICD-10-CM

## 2023-02-20 DIAGNOSIS — G89.29 CHRONIC PAIN OF LEFT KNEE: ICD-10-CM

## 2023-02-21 RX ORDER — GABAPENTIN 600 MG/1
600 TABLET ORAL 3 TIMES DAILY
Qty: 90 TABLET | Refills: 1 | Status: SHIPPED | OUTPATIENT
Start: 2023-02-21

## 2023-02-21 NOTE — TELEPHONE ENCOUNTER
========================  Cristiano reviewed. Follow up appt is scheduled on 4/4/2023 .    Trying to change appointment to March 2, 2023

## 2023-03-02 ENCOUNTER — OFFICE VISIT (OUTPATIENT)
Dept: FAMILY MEDICINE CLINIC | Facility: CLINIC | Age: 58
End: 2023-03-02
Payer: MEDICARE

## 2023-03-02 VITALS
WEIGHT: 263 LBS | TEMPERATURE: 97.5 F | BODY MASS INDEX: 43.82 KG/M2 | DIASTOLIC BLOOD PRESSURE: 80 MMHG | HEIGHT: 65 IN | HEART RATE: 72 BPM | RESPIRATION RATE: 18 BRPM | SYSTOLIC BLOOD PRESSURE: 126 MMHG

## 2023-03-02 DIAGNOSIS — I10 ESSENTIAL HYPERTENSION: ICD-10-CM

## 2023-03-02 DIAGNOSIS — M46.46 LUMBAR DISCITIS: ICD-10-CM

## 2023-03-02 DIAGNOSIS — R93.7 ABNORMAL MRI, LUMBAR SPINE: ICD-10-CM

## 2023-03-02 DIAGNOSIS — M54.50 LUMBAR BACK PAIN: Primary | ICD-10-CM

## 2023-03-02 DIAGNOSIS — R93.89 ABNORMAL MRI: ICD-10-CM

## 2023-03-02 PROCEDURE — 99214 OFFICE O/P EST MOD 30 MIN: CPT | Performed by: FAMILY MEDICINE

## 2023-03-02 NOTE — PROGRESS NOTES
"Chief Complaint  Go over Results.     Subjective          Jordyn Machado presents to [facility]  History of Present Illness    Back pain  Her back pain is \"terrible.\" She has been trying to do certain stretches, which does help a little bit. She can stand a little bit longer around the house. She had an MRI a while ago, was given a CD, but does not have it with her. She has pain mainly when she stands. She reports that yesterday she went to [employer] and her right thigh and knee went numb. If she sits down or sits in the recliner to lay off of it for a few minutes, it will hurt really bad for a few minutes but then eases off. She reports that she gets back up and does something. The tramadol helps with her pain. She denies any fever or chills. She has never had back surgery. Sitting in certain chairs hurts worse than others; her pain has worsened since 01/2023. Her walking around is getting a lot worse. She has some stretches she does that she found on the internet, that are helping a little bit. She has tried to heat and lidocaine patches and tries to put them on herself but when she does, they get wrinkled up really bad, wearing one of them and during the day it helps. She has not bought any of those in a while. She was given a cream, but they quit paying for that. She reports that she had a small co-pay. She reports that at the end of the year, it stopped for 2 months and then started back again; she went the other day to get it filled, it was 5 dollars but it is normally 10 dollars.     Review of Systems   Constitutional: Negative for chills and fever.   All other systems reviewed and are negative.       Objective       Vital Signs:   /80   Pulse 72   Temp 97.5 °F (36.4 °C)   Resp 18   Ht 165.1 cm (65\")   Wt 119 kg (263 lb)   BMI 43.77 kg/m²     Physical Exam  Vitals and nursing note reviewed.   Constitutional:       Appearance: She is well-developed.   HENT: "      Head: Normocephalic and atraumatic.      Right Ear: Hearing and external ear normal.      Left Ear: Hearing and external ear normal.   Eyes:      Conjunctiva/sclera: Conjunctivae normal.      Pupils: Pupils are equal, round, and reactive to light.   Cardiovascular:      Rate and Rhythm: Normal rate and regular rhythm.      Heart sounds: Normal heart sounds. No murmur heard.    No friction rub.   Pulmonary:      Effort: Pulmonary effort is normal. No respiratory distress.      Breath sounds: Normal breath sounds. No wheezing or rales.   Musculoskeletal:      Lumbar back: Tenderness (mild) present. No bony tenderness. Decreased range of motion. Negative right straight leg raise test and negative left straight leg raise test.   Skin:     General: Skin is warm.   Neurological:      Mental Status: She is alert and oriented to person, place, and time.      Deep Tendon Reflexes:      Reflex Scores:       Patellar reflexes are 1+ on the right side and 1+ on the left side.     Comments: Antalgic gait   Psychiatric:         Behavior: Behavior normal.          Result Review :                     Assessment and Plan    Diagnoses and all orders for this visit:    1. Lumbar back pain (Primary)  -     Ambulatory Referral to Orthopedic Surgery    2. Abnormal MRI    3. Abnormal MRI, lumbar spine  -     Ambulatory Referral to Orthopedic Surgery    4. Lumbar discitis  -     Ambulatory Referral to Orthopedic Surgery  -     CBC & Differential  -     Comprehensive Metabolic Panel  -     Sedimentation Rate  -     C-reactive Protein    5. Essential hypertension  -     CBC & Differential  -     Comprehensive Metabolic Panel    1. Back pain  - I will refer the patient to Dr. Chavira for further evaluation and treatment.  - I will refill the patient's tramadol.    2. Hypertension  - The patient will continue her current medication regimen.        DISCUSSION    Chronic lumbar back pain.  Had MRI done in January and showed possible chronic  discitis but no abscess formation.  She denies any fever or any systemic symptoms.  Will refer to orthopedic for urgent evaluation.  Check CBC, CMP, sed rate and C-reactive protein.  She will call me next week if she does not hear back from them.  She will call if she develops any increasing pain, fever or chills.    Hypertension.  Blood pressure stable.  Continue medication.  Check CBC and CMP today.    No change in medication.  Taking tramadol appropriately.    Cristiano dated 3/6/2023  was reviewed and appropriate.     Follow Up   Return if symptoms worsen or fail to improve.    Patient was given instructions and counseling regarding her condition or for health maintenance advice. Please see specific information pulled into the AVS if appropriate.       Dl Lucas MD     Transcribed from ambient dictation for Dl Lucas MD by Beatriz Perez.  03/02/23   17:14 EST    Patient or patient representative verbalized consent to the visit recording.  I have personally performed the services described in this document as transcribed by the above individual, and it is both accurate and complete.  Dl Lucas MD  3/6/2023  18:09 EST

## 2023-03-03 LAB
ALBUMIN SERPL-MCNC: 4.5 G/DL (ref 3.8–4.9)
ALBUMIN/GLOB SERPL: 2 {RATIO} (ref 1.2–2.2)
ALP SERPL-CCNC: 77 IU/L (ref 44–121)
ALT SERPL-CCNC: 14 IU/L (ref 0–32)
AST SERPL-CCNC: 19 IU/L (ref 0–40)
BASOPHILS # BLD AUTO: 0.1 X10E3/UL (ref 0–0.2)
BASOPHILS NFR BLD AUTO: 1 %
BILIRUB SERPL-MCNC: 0.3 MG/DL (ref 0–1.2)
BUN SERPL-MCNC: 19 MG/DL (ref 6–24)
BUN/CREAT SERPL: 19 (ref 9–23)
CALCIUM SERPL-MCNC: 9.3 MG/DL (ref 8.7–10.2)
CHLORIDE SERPL-SCNC: 101 MMOL/L (ref 96–106)
CO2 SERPL-SCNC: 28 MMOL/L (ref 20–29)
CREAT SERPL-MCNC: 1.01 MG/DL (ref 0.57–1)
CRP SERPL-MCNC: 7 MG/L (ref 0–10)
EGFRCR SERPLBLD CKD-EPI 2021: 66 ML/MIN/1.73
EOSINOPHIL # BLD AUTO: 0.1 X10E3/UL (ref 0–0.4)
EOSINOPHIL NFR BLD AUTO: 1 %
ERYTHROCYTE [DISTWIDTH] IN BLOOD BY AUTOMATED COUNT: 12.7 % (ref 11.7–15.4)
ERYTHROCYTE [SEDIMENTATION RATE] IN BLOOD BY WESTERGREN METHOD: 7 MM/HR (ref 0–40)
GLOBULIN SER CALC-MCNC: 2.3 G/DL (ref 1.5–4.5)
GLUCOSE SERPL-MCNC: 94 MG/DL (ref 70–99)
HCT VFR BLD AUTO: 40 % (ref 34–46.6)
HGB BLD-MCNC: 13 G/DL (ref 11.1–15.9)
IMM GRANULOCYTES # BLD AUTO: 0 X10E3/UL (ref 0–0.1)
IMM GRANULOCYTES NFR BLD AUTO: 0 %
LYMPHOCYTES # BLD AUTO: 2 X10E3/UL (ref 0.7–3.1)
LYMPHOCYTES NFR BLD AUTO: 21 %
MCH RBC QN AUTO: 28.2 PG (ref 26.6–33)
MCHC RBC AUTO-ENTMCNC: 32.5 G/DL (ref 31.5–35.7)
MCV RBC AUTO: 87 FL (ref 79–97)
MONOCYTES # BLD AUTO: 0.6 X10E3/UL (ref 0.1–0.9)
MONOCYTES NFR BLD AUTO: 6 %
NEUTROPHILS # BLD AUTO: 6.7 X10E3/UL (ref 1.4–7)
NEUTROPHILS NFR BLD AUTO: 71 %
PLATELET # BLD AUTO: 311 X10E3/UL (ref 150–450)
POTASSIUM SERPL-SCNC: 4.4 MMOL/L (ref 3.5–5.2)
PROT SERPL-MCNC: 6.8 G/DL (ref 6–8.5)
RBC # BLD AUTO: 4.61 X10E6/UL (ref 3.77–5.28)
SODIUM SERPL-SCNC: 144 MMOL/L (ref 134–144)
WBC # BLD AUTO: 9.5 X10E3/UL (ref 3.4–10.8)

## 2023-03-26 DIAGNOSIS — G89.29 CHRONIC PAIN OF LEFT KNEE: ICD-10-CM

## 2023-03-26 DIAGNOSIS — M25.562 CHRONIC PAIN OF LEFT KNEE: ICD-10-CM

## 2023-03-26 DIAGNOSIS — G89.29 CHRONIC PAIN OF RIGHT KNEE: ICD-10-CM

## 2023-03-26 DIAGNOSIS — M54.50 LUMBAR BACK PAIN: ICD-10-CM

## 2023-03-26 DIAGNOSIS — M25.561 CHRONIC PAIN OF RIGHT KNEE: ICD-10-CM

## 2023-03-27 ENCOUNTER — TELEPHONE (OUTPATIENT)
Dept: FAMILY MEDICINE CLINIC | Facility: CLINIC | Age: 58
End: 2023-03-27
Payer: MEDICARE

## 2023-03-27 DIAGNOSIS — M54.50 LUMBAR BACK PAIN: ICD-10-CM

## 2023-03-27 DIAGNOSIS — G89.29 CHRONIC PAIN OF RIGHT KNEE: ICD-10-CM

## 2023-03-27 DIAGNOSIS — G89.29 CHRONIC PAIN OF LEFT KNEE: ICD-10-CM

## 2023-03-27 DIAGNOSIS — M25.561 CHRONIC PAIN OF RIGHT KNEE: ICD-10-CM

## 2023-03-27 DIAGNOSIS — M25.562 CHRONIC PAIN OF LEFT KNEE: ICD-10-CM

## 2023-03-27 RX ORDER — TRAMADOL HYDROCHLORIDE 50 MG/1
50 TABLET ORAL EVERY 6 HOURS PRN
Qty: 60 TABLET | Refills: 2 | Status: SHIPPED | OUTPATIENT
Start: 2023-03-27 | End: 2023-03-27 | Stop reason: SDUPTHER

## 2023-03-28 RX ORDER — TRAMADOL HYDROCHLORIDE 50 MG/1
50 TABLET ORAL EVERY 6 HOURS PRN
Qty: 60 TABLET | Refills: 2 | Status: SHIPPED | OUTPATIENT
Start: 2023-03-28

## 2023-03-28 NOTE — TELEPHONE ENCOUNTER
Please call patient.  I refilled the pain medication i.e. tramadol.  However, did she go see orthopedics about the abnormal MRI?  If yes, need the recent visit note.  If not, she needs to follow-up with them.

## 2023-03-31 NOTE — TELEPHONE ENCOUNTER
Hub staff attempted to follow warm transfer process and was unsuccessful     Caller: Jordyn Machado    Relationship to patient: Self    Best call back number:[phone]    Patient is needing:   PATIENT RETURNING A CALL BACK TO THE OFFICE   Jeanine Aguirre MAMedical AssistantSigned  [phone]                           LVM to call back                                UNABLE TO WARM TRANSFER CALL TO THE OFFICE

## 2023-04-18 DIAGNOSIS — M79.673 PAIN OF FOOT, UNSPECIFIED LATERALITY: Primary | ICD-10-CM

## 2023-04-21 DIAGNOSIS — M25.562 CHRONIC PAIN OF LEFT KNEE: ICD-10-CM

## 2023-04-21 DIAGNOSIS — G62.9 NEUROPATHY: ICD-10-CM

## 2023-04-21 DIAGNOSIS — G89.29 CHRONIC PAIN OF LEFT KNEE: ICD-10-CM

## 2023-04-21 RX ORDER — GABAPENTIN 600 MG/1
600 TABLET ORAL 3 TIMES DAILY
Qty: 90 TABLET | Refills: 1 | Status: SHIPPED | OUTPATIENT
Start: 2023-04-21

## 2023-05-19 DIAGNOSIS — M54.50 LUMBAR BACK PAIN: ICD-10-CM

## 2023-05-19 DIAGNOSIS — M25.562 CHRONIC PAIN OF LEFT KNEE: ICD-10-CM

## 2023-05-19 DIAGNOSIS — G89.29 CHRONIC PAIN OF LEFT KNEE: ICD-10-CM

## 2023-05-19 DIAGNOSIS — G89.29 CHRONIC PAIN OF RIGHT KNEE: ICD-10-CM

## 2023-05-19 DIAGNOSIS — M25.561 CHRONIC PAIN OF RIGHT KNEE: ICD-10-CM

## 2023-05-22 RX ORDER — TRAMADOL HYDROCHLORIDE 50 MG/1
50 TABLET ORAL EVERY 6 HOURS PRN
Qty: 60 TABLET | Refills: 2 | Status: SHIPPED | OUTPATIENT
Start: 2023-05-22

## 2023-05-22 RX ORDER — MELOXICAM 15 MG/1
15 TABLET ORAL DAILY
Qty: 30 TABLET | Refills: 0 | Status: SHIPPED | OUTPATIENT
Start: 2023-05-22

## 2023-07-14 PROBLEM — I10 ESSENTIAL HYPERTENSION: Status: ACTIVE | Noted: 2023-07-14

## 2023-07-26 RX ORDER — LISINOPRIL AND HYDROCHLOROTHIAZIDE 20; 12.5 MG/1; MG/1
TABLET ORAL
Qty: 90 TABLET | Refills: 1 | Status: SHIPPED | OUTPATIENT
Start: 2023-07-26

## 2023-07-28 RX ORDER — MELOXICAM 15 MG/1
TABLET ORAL
Qty: 30 TABLET | Refills: 0 | Status: SHIPPED | OUTPATIENT
Start: 2023-07-28

## 2023-08-12 DIAGNOSIS — G89.29 CHRONIC PAIN OF RIGHT KNEE: ICD-10-CM

## 2023-08-12 DIAGNOSIS — M25.561 CHRONIC PAIN OF RIGHT KNEE: ICD-10-CM

## 2023-08-12 DIAGNOSIS — M25.562 CHRONIC PAIN OF LEFT KNEE: ICD-10-CM

## 2023-08-12 DIAGNOSIS — M54.50 LUMBAR BACK PAIN: ICD-10-CM

## 2023-08-12 DIAGNOSIS — G89.29 CHRONIC PAIN OF LEFT KNEE: ICD-10-CM

## 2023-08-14 RX ORDER — TRAMADOL HYDROCHLORIDE 50 MG/1
50 TABLET ORAL EVERY 6 HOURS PRN
Qty: 60 TABLET | Refills: 2 | Status: SHIPPED | OUTPATIENT
Start: 2023-08-14

## 2023-08-31 RX ORDER — MELOXICAM 15 MG/1
15 TABLET ORAL DAILY
Qty: 30 TABLET | Refills: 1 | Status: SHIPPED | OUTPATIENT
Start: 2023-08-31

## 2023-09-22 DIAGNOSIS — M54.50 LUMBAR BACK PAIN: ICD-10-CM

## 2023-09-22 DIAGNOSIS — M25.562 CHRONIC PAIN OF LEFT KNEE: ICD-10-CM

## 2023-09-22 DIAGNOSIS — G89.29 CHRONIC PAIN OF LEFT KNEE: ICD-10-CM

## 2023-09-22 DIAGNOSIS — G89.29 CHRONIC PAIN OF RIGHT KNEE: ICD-10-CM

## 2023-09-22 DIAGNOSIS — M25.561 CHRONIC PAIN OF RIGHT KNEE: ICD-10-CM

## 2023-09-22 RX ORDER — TRAMADOL HYDROCHLORIDE 50 MG/1
50 TABLET ORAL EVERY 6 HOURS PRN
Qty: 60 TABLET | Refills: 2 | Status: SHIPPED | OUTPATIENT
Start: 2023-09-22

## 2023-09-23 DIAGNOSIS — M25.562 CHRONIC PAIN OF LEFT KNEE: ICD-10-CM

## 2023-09-23 DIAGNOSIS — G89.29 CHRONIC PAIN OF RIGHT KNEE: ICD-10-CM

## 2023-09-23 DIAGNOSIS — M25.561 CHRONIC PAIN OF RIGHT KNEE: ICD-10-CM

## 2023-09-23 DIAGNOSIS — G89.29 CHRONIC PAIN OF LEFT KNEE: ICD-10-CM

## 2023-09-23 DIAGNOSIS — M54.50 LUMBAR BACK PAIN: ICD-10-CM

## 2023-09-25 RX ORDER — TRAMADOL HYDROCHLORIDE 50 MG/1
50 TABLET ORAL EVERY 6 HOURS PRN
Qty: 60 TABLET | Refills: 2 | OUTPATIENT
Start: 2023-09-25

## 2023-10-06 ENCOUNTER — OFFICE VISIT (OUTPATIENT)
Dept: FAMILY MEDICINE CLINIC | Facility: CLINIC | Age: 58
End: 2023-10-06
Payer: MEDICARE

## 2023-10-06 VITALS
BODY MASS INDEX: 41.32 KG/M2 | HEART RATE: 100 BPM | HEIGHT: 65 IN | SYSTOLIC BLOOD PRESSURE: 124 MMHG | DIASTOLIC BLOOD PRESSURE: 78 MMHG | WEIGHT: 248 LBS | OXYGEN SATURATION: 96 % | RESPIRATION RATE: 16 BRPM

## 2023-10-06 DIAGNOSIS — H65.02 NON-RECURRENT ACUTE SEROUS OTITIS MEDIA OF LEFT EAR: ICD-10-CM

## 2023-10-06 DIAGNOSIS — J06.9 PROTRACTED URI: Primary | ICD-10-CM

## 2023-10-06 PROBLEM — E66.9 OBESITY (BMI 30-39.9): Status: ACTIVE | Noted: 2017-01-24

## 2023-10-06 PROBLEM — Z01.810 PRE-OPERATIVE CARDIOVASCULAR EXAMINATION: Status: ACTIVE | Noted: 2017-01-24

## 2023-10-06 PROBLEM — S82.209A TIBIA FRACTURE: Status: ACTIVE | Noted: 2023-10-06

## 2023-10-06 PROBLEM — R94.31 ABNORMAL EKG: Status: ACTIVE | Noted: 2023-10-06

## 2023-10-06 PROCEDURE — 99213 OFFICE O/P EST LOW 20 MIN: CPT | Performed by: FAMILY MEDICINE

## 2023-10-06 PROCEDURE — 1160F RVW MEDS BY RX/DR IN RCRD: CPT | Performed by: FAMILY MEDICINE

## 2023-10-06 PROCEDURE — 3078F DIAST BP <80 MM HG: CPT | Performed by: FAMILY MEDICINE

## 2023-10-06 PROCEDURE — 3074F SYST BP LT 130 MM HG: CPT | Performed by: FAMILY MEDICINE

## 2023-10-06 PROCEDURE — 1159F MED LIST DOCD IN RCRD: CPT | Performed by: FAMILY MEDICINE

## 2023-10-06 RX ORDER — AMOXICILLIN AND CLAVULANATE POTASSIUM 875; 125 MG/1; MG/1
1 TABLET, FILM COATED ORAL 2 TIMES DAILY
Qty: 20 TABLET | Refills: 0 | Status: SHIPPED | OUTPATIENT
Start: 2023-10-06

## 2023-10-06 NOTE — PROGRESS NOTES
"Chief Complaint  Nasal Congestion (Fever, cough, runny nose, UC treated with Prednisone on 9/30/23)    Subjective          Jordyn Machado presents to [facility]  History of Present Illness    Jordyn Machado is a 58-year-old female who presents today for evaluation of cough, congestion, rhinorrhea, and influenza symptoms.    The patient started feeling feverish, coughing, congestion, rhinorrhea, and flu symptoms 09/25/2023. She had a fever for 3 days, which resolved; however, she continued to have cough and congestion. She went to urgent care in [place] on Saturday, 09/30/2023. She was tested for COVID-19 and 2 influenza strains, which were all negative. She was given a 4-day supply of prednisone for the wheezing. She is still wheezing, coughing, and has a stuffy head. She is coughing up yellowish phlegm. She was not prescribed an antibiotic. She sleeps well at night; however, she wakes up coughing. She denies chest pain, vomiting, or diarrhea. Her left ear has been causing her discomfort. She has taken Augmentin in the past.    Review of Systems   Constitutional: Negative.  Negative for fever.   HENT:  Positive for congestion.    Respiratory:  Positive for cough and wheezing.    Cardiovascular:  Negative for chest pain.   Gastrointestinal:  Negative for diarrhea, nausea and vomiting.      Objective       Vital Signs:   /78 (BP Location: Left arm, Patient Position: Sitting, Cuff Size: Adult)   Pulse 100   Resp 16   Ht 165.1 cm (65\")   Wt 112 kg (248 lb)   SpO2 96%   BMI 41.27 kg/m²     Physical Exam  Vitals and nursing note reviewed.   Constitutional:       General: She is not in acute distress.     Appearance: Normal appearance. She is well-developed. She is not ill-appearing.   HENT:      Head: Normocephalic and atraumatic.      Right Ear: Hearing, tympanic membrane, ear canal and external ear normal.      Left Ear: Hearing, ear canal and external ear normal. " Tympanic membrane is erythematous and bulging.      Mouth/Throat:      Mouth: Mucous membranes are moist.      Pharynx: No oropharyngeal exudate or posterior oropharyngeal erythema.   Eyes:      Conjunctiva/sclera: Conjunctivae normal.      Pupils: Pupils are equal, round, and reactive to light.   Neck:      Thyroid: No thyromegaly.   Cardiovascular:      Rate and Rhythm: Normal rate and regular rhythm.      Heart sounds: Normal heart sounds. No murmur heard.    No friction rub. No gallop.   Pulmonary:      Effort: Pulmonary effort is normal. No respiratory distress.      Breath sounds: Wheezing (faint) and rhonchi present. No rales.   Musculoskeletal:      Cervical back: Normal range of motion and neck supple.      Right lower leg: No edema.      Left lower leg: No edema.   Skin:     General: Skin is warm and dry.   Neurological:      General: No focal deficit present.      Mental Status: She is alert and oriented to person, place, and time.   Psychiatric:         Behavior: Behavior normal.        Result Review :                     Assessment and Plan    Diagnoses and all orders for this visit:    1. Protracted URI (Primary)  -     amoxicillin-clavulanate (AUGMENTIN) 875-125 MG per tablet; Take 1 tablet by mouth 2 (Two) Times a Day.  Dispense: 20 tablet; Refill: 0    2. Non-recurrent acute serous otitis media of left ear  -     amoxicillin-clavulanate (AUGMENTIN) 875-125 MG per tablet; Take 1 tablet by mouth 2 (Two) Times a Day.  Dispense: 20 tablet; Refill: 0          DISCUSSION    Patient with protracted persistent upper respiratory infection. Examination reveals a left otitis media as well. Start Augmentin 875 mg 1 tablet 2 time a day. Continue to increase fluids and rest.     Call or follow up if not improving.           Follow Up   Return if symptoms worsen or fail to improve, for Next scheduled follow up.    Patient was given instructions and counseling regarding her condition or for health maintenance  advice. Please see specific information pulled into the AVS if appropriate.       Dl Lucas MD    Transcribed from ambient dictation for Dl Lucas MD by Moon Cueva.  10/06/23   15:58 EDT    Patient or patient representative verbalized consent to the visit recording.  I have personally performed the services described in this document as transcribed by the above individual, and it is both accurate and complete.  Dl Lucas MD  10/6/2023  17:54 EDT

## 2023-10-17 DIAGNOSIS — M25.562 CHRONIC PAIN OF LEFT KNEE: ICD-10-CM

## 2023-10-17 DIAGNOSIS — G89.29 CHRONIC PAIN OF LEFT KNEE: ICD-10-CM

## 2023-10-17 DIAGNOSIS — G62.9 NEUROPATHY: ICD-10-CM

## 2023-10-17 RX ORDER — GABAPENTIN 600 MG/1
600 TABLET ORAL 3 TIMES DAILY
Qty: 90 TABLET | Refills: 1 | Status: SHIPPED | OUTPATIENT
Start: 2023-10-17

## 2023-11-04 DIAGNOSIS — M25.561 CHRONIC PAIN OF RIGHT KNEE: ICD-10-CM

## 2023-11-04 DIAGNOSIS — G89.29 CHRONIC PAIN OF LEFT KNEE: ICD-10-CM

## 2023-11-04 DIAGNOSIS — G89.29 CHRONIC PAIN OF RIGHT KNEE: ICD-10-CM

## 2023-11-04 DIAGNOSIS — M25.562 CHRONIC PAIN OF LEFT KNEE: ICD-10-CM

## 2023-11-04 DIAGNOSIS — M54.50 LUMBAR BACK PAIN: ICD-10-CM

## 2023-11-06 RX ORDER — TRAMADOL HYDROCHLORIDE 50 MG/1
50 TABLET ORAL EVERY 6 HOURS PRN
Qty: 60 TABLET | Refills: 2 | Status: SHIPPED | OUTPATIENT
Start: 2023-11-06

## 2023-11-09 RX ORDER — MELOXICAM 15 MG/1
15 TABLET ORAL DAILY
Qty: 30 TABLET | Refills: 1 | Status: SHIPPED | OUTPATIENT
Start: 2023-11-09

## 2023-11-14 ENCOUNTER — OFFICE VISIT (OUTPATIENT)
Dept: FAMILY MEDICINE CLINIC | Facility: CLINIC | Age: 58
End: 2023-11-14
Payer: MEDICARE

## 2023-11-14 VITALS
DIASTOLIC BLOOD PRESSURE: 82 MMHG | WEIGHT: 245 LBS | TEMPERATURE: 97.5 F | HEIGHT: 65 IN | SYSTOLIC BLOOD PRESSURE: 112 MMHG | BODY MASS INDEX: 40.82 KG/M2 | RESPIRATION RATE: 18 BRPM | HEART RATE: 88 BPM

## 2023-11-14 DIAGNOSIS — H66.002 ACUTE SUPPURATIVE OTITIS MEDIA OF LEFT EAR WITHOUT SPONTANEOUS RUPTURE OF TYMPANIC MEMBRANE, RECURRENCE NOT SPECIFIED: Primary | ICD-10-CM

## 2023-11-14 DIAGNOSIS — G89.29 CHRONIC PAIN OF LEFT KNEE: ICD-10-CM

## 2023-11-14 DIAGNOSIS — I10 ESSENTIAL HYPERTENSION: ICD-10-CM

## 2023-11-14 DIAGNOSIS — M54.50 LUMBAR BACK PAIN: ICD-10-CM

## 2023-11-14 DIAGNOSIS — G89.29 CHRONIC PAIN OF RIGHT KNEE: ICD-10-CM

## 2023-11-14 DIAGNOSIS — Z12.11 SCREEN FOR COLON CANCER: ICD-10-CM

## 2023-11-14 DIAGNOSIS — M25.562 CHRONIC PAIN OF LEFT KNEE: ICD-10-CM

## 2023-11-14 DIAGNOSIS — M25.561 CHRONIC PAIN OF RIGHT KNEE: ICD-10-CM

## 2023-11-14 RX ORDER — FLUTICASONE PROPIONATE 50 MCG
2 SPRAY, SUSPENSION (ML) NASAL DAILY
Qty: 16 G | Refills: 1 | Status: SHIPPED | OUTPATIENT
Start: 2023-11-14

## 2023-11-14 RX ORDER — CEFDINIR 300 MG/1
300 CAPSULE ORAL 2 TIMES DAILY
Qty: 20 CAPSULE | Refills: 0 | Status: SHIPPED | OUTPATIENT
Start: 2023-11-14

## 2023-11-14 NOTE — PROGRESS NOTES
Chief Complaint  Hypertension (F/u) and Earache (Left )    Subjective          Jordyn Machado presents to [facility]    Jordyn Machado is a 58-year-old female who presents today for a follow-up visit.    Left ear pain  Her left ear is itchy and achy. She denies any drainage. If she chews anything on the left side, it irritates it. She describes the pain as sudden pain and throbbing. She has tried putting peroxide in it and it is not itchy for a day or so, but then it starts back and itching again. She denies getting any water in her ear. She was given antibiotics at her last visit in 10/2023. She thought the antibiotic took care of it, but the itchy stuff started coming back and then it just started aching again. She had ear pain in 07/2023 as well. She conveys that it was not red, but it was itchy then. She adds that it is worse at night. She denies any nasal congestion.    Left ankle and foot  She conveys she was seen by the podiatrist that she was referred to. He decided to perform surgery, but he does not do surgery in [place]. She conveys she wants to postpone it until after the holidays, and her other problems would be someone to help her and transportation.    Hypertension  The patient's blood pressure looks good today. She is still taking her blood pressure medication. She denies any chest pain, trouble breathing, headaches, or swelling in her legs.    Back pain  She is still taking tramadol for her back, knee, and hips. There has been no change in her pain. She would not have to get out of bed.     Weight loss  She has lost 17 pounds since 03/2023. She has been trying to lose weight. She is not eating as much. She is not able to do a whole lot of exercise. She has cut back on pop. She mostly drinks water.    Health maintenance  She has not had a colonoscopy before. She denies any family history of colon cancer. She denies hematochezia. She has never had a mammogram.  "She does not get influenza vaccines. She has never had a COVID-19 vaccine. She does not want to get the COVID-19 vaccine. She denies any sinus congestion. She has eaten and will return for lab work.      Review of Systems   Respiratory: Negative.     Gastrointestinal: Negative.    Musculoskeletal:  Positive for arthralgias and back pain.        Objective       Vital Signs:   /82   Pulse 88   Temp 97.5 °F (36.4 °C)   Resp 18   Ht 165.1 cm (65\")   Wt 111 kg (245 lb)   BMI 40.77 kg/m²     Physical Exam  Vitals and nursing note reviewed.   Constitutional:       General: She is not in acute distress.     Appearance: Normal appearance. She is well-developed. She is not ill-appearing.   HENT:      Head: Normocephalic and atraumatic.      Right Ear: Hearing, tympanic membrane, ear canal and external ear normal.      Left Ear: Hearing and external ear normal. Tympanic membrane is erythematous and bulging.   Eyes:      Conjunctiva/sclera: Conjunctivae normal.      Pupils: Pupils are equal, round, and reactive to light.   Cardiovascular:      Rate and Rhythm: Normal rate and regular rhythm.      Heart sounds: Normal heart sounds. No murmur heard.     No friction rub.   Pulmonary:      Effort: Pulmonary effort is normal. No respiratory distress.      Breath sounds: Normal breath sounds. No wheezing or rales.   Skin:     General: Skin is warm.   Neurological:      Mental Status: She is alert.   Psychiatric:         Behavior: Behavior normal.          Result Review :                     Assessment and Plan    Diagnoses and all orders for this visit:    1. Acute suppurative otitis media of left ear without spontaneous rupture of tympanic membrane, recurrence not specified (Primary)  -     cefdinir (OMNICEF) 300 MG capsule; Take 1 capsule by mouth 2 (Two) Times a Day.  Dispense: 20 capsule; Refill: 0  -     fluticasone (FLONASE) 50 MCG/ACT nasal spray; 2 sprays into the nostril(s) as directed by provider Daily.  " Dispense: 16 g; Refill: 1    2. Essential hypertension    3. Lumbar back pain    4. Chronic pain of right knee    5. Chronic pain of left knee    6. Screen for colon cancer  -     Cologuard - Stool, Per Rectum; Future          DISCUSSION    Left ear pain.  - Prescribed Omnicef 1 tablet twice daily.  - Prescribed steroid nasal spray 2 sprays in the nostril once daily for 2 to 3 weeks.  - If symptoms do not improve, we will refer her to an ENT specialist.    Colon cancer screening.  - Ordered Cologuard test. She is aware will need colonoscopy of positive    Health maintenance.  - Ordered mammogram.  - Declined influenza vaccine.  - Declined COVID-19 vaccine.    Follow up in 3 months.      Cristiano dated 11/15/2023  was reviewed and appropriate.     Follow Up   Return in about 3 months (around 2/14/2024).    Patient was given instructions and counseling regarding her condition or for health maintenance advice. Please see specific information pulled into the AVS if appropriate.       Dl Luacs MD    Transcribed from ambient dictation for Dl Lucas MD by Igor Shannon.  11/14/23   17:02 EST    Patient or patient representative verbalized consent to the visit recording.  I have personally performed the services described in this document as transcribed by the above individual, and it is both accurate and complete.  Dl Lucas MD  11/15/2023  10:22 EST

## 2023-11-19 DIAGNOSIS — G89.29 CHRONIC PAIN OF LEFT KNEE: ICD-10-CM

## 2023-11-19 DIAGNOSIS — G62.9 NEUROPATHY: ICD-10-CM

## 2023-11-19 DIAGNOSIS — M25.562 CHRONIC PAIN OF LEFT KNEE: ICD-10-CM

## 2023-11-20 RX ORDER — GABAPENTIN 600 MG/1
600 TABLET ORAL 3 TIMES DAILY
Qty: 90 TABLET | Refills: 2 | Status: SHIPPED | OUTPATIENT
Start: 2023-11-20

## 2023-11-20 NOTE — TELEPHONE ENCOUNTER
========================  Cristiano reviewed 11/20/2023 . Follow up appt is scheduled on 2/15/2024 .    Last office visit with me : 11/14/2023

## 2023-11-26 DIAGNOSIS — H66.002 ACUTE SUPPURATIVE OTITIS MEDIA OF LEFT EAR WITHOUT SPONTANEOUS RUPTURE OF TYMPANIC MEMBRANE, RECURRENCE NOT SPECIFIED: ICD-10-CM

## 2023-11-28 RX ORDER — CEFDINIR 300 MG/1
300 CAPSULE ORAL 2 TIMES DAILY
Qty: 20 CAPSULE | Refills: 0 | Status: SHIPPED | OUTPATIENT
Start: 2023-11-28

## 2023-12-14 DIAGNOSIS — H92.03 CHRONIC PAIN OF BOTH EARS: Primary | ICD-10-CM

## 2023-12-14 DIAGNOSIS — G89.29 CHRONIC PAIN OF BOTH EARS: Primary | ICD-10-CM

## 2023-12-16 DIAGNOSIS — M54.50 LUMBAR BACK PAIN: ICD-10-CM

## 2023-12-16 DIAGNOSIS — G89.29 CHRONIC PAIN OF RIGHT KNEE: ICD-10-CM

## 2023-12-16 DIAGNOSIS — M25.561 CHRONIC PAIN OF RIGHT KNEE: ICD-10-CM

## 2023-12-16 DIAGNOSIS — G62.9 NEUROPATHY: ICD-10-CM

## 2023-12-16 DIAGNOSIS — G89.29 CHRONIC PAIN OF LEFT KNEE: ICD-10-CM

## 2023-12-16 DIAGNOSIS — M25.562 CHRONIC PAIN OF LEFT KNEE: ICD-10-CM

## 2023-12-18 RX ORDER — TRAMADOL HYDROCHLORIDE 50 MG/1
50 TABLET ORAL EVERY 6 HOURS PRN
Qty: 60 TABLET | Refills: 2 | Status: SHIPPED | OUTPATIENT
Start: 2023-12-18

## 2023-12-18 RX ORDER — GABAPENTIN 600 MG/1
600 TABLET ORAL 3 TIMES DAILY
Qty: 90 TABLET | Refills: 2 | Status: SHIPPED | OUTPATIENT
Start: 2023-12-18

## 2023-12-18 NOTE — TELEPHONE ENCOUNTER
========================  Cristiano reviewed 12/18/2023 . Follow up appt is scheduled on 2/15/2024 .    Last office visit with me : 11/14/2023

## 2024-01-08 DIAGNOSIS — M25.561 CHRONIC PAIN OF RIGHT KNEE: ICD-10-CM

## 2024-01-08 DIAGNOSIS — M54.50 LUMBAR BACK PAIN: ICD-10-CM

## 2024-01-08 DIAGNOSIS — M25.562 CHRONIC PAIN OF LEFT KNEE: ICD-10-CM

## 2024-01-08 DIAGNOSIS — G89.29 CHRONIC PAIN OF LEFT KNEE: ICD-10-CM

## 2024-01-08 DIAGNOSIS — G89.29 CHRONIC PAIN OF RIGHT KNEE: ICD-10-CM

## 2024-01-09 RX ORDER — TIZANIDINE 4 MG/1
4-8 TABLET ORAL NIGHTLY PRN
Qty: 60 TABLET | Refills: 2 | Status: SHIPPED | OUTPATIENT
Start: 2024-01-09

## 2024-01-09 RX ORDER — MELOXICAM 15 MG/1
15 TABLET ORAL DAILY
Qty: 30 TABLET | Refills: 2 | Status: SHIPPED | OUTPATIENT
Start: 2024-01-09

## 2024-01-26 RX ORDER — LISINOPRIL AND HYDROCHLOROTHIAZIDE 20; 12.5 MG/1; MG/1
1 TABLET ORAL DAILY
Qty: 90 TABLET | Refills: 1 | Status: SHIPPED | OUTPATIENT
Start: 2024-01-26

## 2024-01-27 DIAGNOSIS — M25.562 CHRONIC PAIN OF LEFT KNEE: ICD-10-CM

## 2024-01-27 DIAGNOSIS — G89.29 CHRONIC PAIN OF RIGHT KNEE: ICD-10-CM

## 2024-01-27 DIAGNOSIS — G89.29 CHRONIC PAIN OF LEFT KNEE: ICD-10-CM

## 2024-01-27 DIAGNOSIS — M54.50 LUMBAR BACK PAIN: ICD-10-CM

## 2024-01-27 DIAGNOSIS — M25.561 CHRONIC PAIN OF RIGHT KNEE: ICD-10-CM

## 2024-01-29 RX ORDER — TRAMADOL HYDROCHLORIDE 50 MG/1
50 TABLET ORAL EVERY 6 HOURS PRN
Qty: 60 TABLET | Refills: 2 | Status: SHIPPED | OUTPATIENT
Start: 2024-01-29

## 2024-01-29 NOTE — TELEPHONE ENCOUNTER
========================  Cristiano reviewed 1/29/2024 . Follow up appt is scheduled on 2/15/2024 .    Last office visit with me : 11/14/2023

## 2024-01-31 RX ORDER — LISINOPRIL AND HYDROCHLOROTHIAZIDE 20; 12.5 MG/1; MG/1
1 TABLET ORAL DAILY
Qty: 90 TABLET | Refills: 1 | OUTPATIENT
Start: 2024-01-31

## 2024-02-15 ENCOUNTER — OFFICE VISIT (OUTPATIENT)
Dept: FAMILY MEDICINE CLINIC | Facility: CLINIC | Age: 59
End: 2024-02-15
Payer: MEDICARE

## 2024-02-15 VITALS
TEMPERATURE: 97.5 F | WEIGHT: 242 LBS | DIASTOLIC BLOOD PRESSURE: 82 MMHG | SYSTOLIC BLOOD PRESSURE: 126 MMHG | RESPIRATION RATE: 18 BRPM | HEIGHT: 65 IN | BODY MASS INDEX: 40.32 KG/M2 | HEART RATE: 84 BPM

## 2024-02-15 DIAGNOSIS — M54.50 LUMBAR BACK PAIN: Primary | ICD-10-CM

## 2024-02-15 DIAGNOSIS — G62.9 NEUROPATHY: ICD-10-CM

## 2024-02-15 DIAGNOSIS — Z12.11 COLON CANCER SCREENING: ICD-10-CM

## 2024-02-15 DIAGNOSIS — I10 ESSENTIAL HYPERTENSION: ICD-10-CM

## 2024-02-16 LAB
ALBUMIN SERPL-MCNC: 4.4 G/DL (ref 3.8–4.9)
ALBUMIN/GLOB SERPL: 1.8 {RATIO} (ref 1.2–2.2)
ALP SERPL-CCNC: 74 IU/L (ref 44–121)
ALT SERPL-CCNC: 16 IU/L (ref 0–32)
AST SERPL-CCNC: 18 IU/L (ref 0–40)
BASOPHILS # BLD AUTO: 0.1 X10E3/UL (ref 0–0.2)
BASOPHILS NFR BLD AUTO: 1 %
BILIRUB SERPL-MCNC: 0.3 MG/DL (ref 0–1.2)
BUN SERPL-MCNC: 21 MG/DL (ref 6–24)
BUN/CREAT SERPL: 19 (ref 9–23)
CALCIUM SERPL-MCNC: 9.4 MG/DL (ref 8.7–10.2)
CHLORIDE SERPL-SCNC: 98 MMOL/L (ref 96–106)
CHOLEST SERPL-MCNC: 192 MG/DL (ref 100–199)
CHOLEST/HDLC SERPL: 3 RATIO (ref 0–4.4)
CO2 SERPL-SCNC: 26 MMOL/L (ref 20–29)
CREAT SERPL-MCNC: 1.08 MG/DL (ref 0.57–1)
EGFRCR SERPLBLD CKD-EPI 2021: 60 ML/MIN/1.73
EOSINOPHIL # BLD AUTO: 0.2 X10E3/UL (ref 0–0.4)
EOSINOPHIL NFR BLD AUTO: 1 %
ERYTHROCYTE [DISTWIDTH] IN BLOOD BY AUTOMATED COUNT: 13.2 % (ref 11.7–15.4)
GLOBULIN SER CALC-MCNC: 2.5 G/DL (ref 1.5–4.5)
GLUCOSE SERPL-MCNC: 92 MG/DL (ref 70–99)
HCT VFR BLD AUTO: 39.8 % (ref 34–46.6)
HDLC SERPL-MCNC: 64 MG/DL
HGB BLD-MCNC: 12.7 G/DL (ref 11.1–15.9)
IMM GRANULOCYTES # BLD AUTO: 0 X10E3/UL (ref 0–0.1)
IMM GRANULOCYTES NFR BLD AUTO: 0 %
LDLC SERPL CALC-MCNC: 110 MG/DL (ref 0–99)
LYMPHOCYTES # BLD AUTO: 2.8 X10E3/UL (ref 0.7–3.1)
LYMPHOCYTES NFR BLD AUTO: 25 %
MCH RBC QN AUTO: 28.2 PG (ref 26.6–33)
MCHC RBC AUTO-ENTMCNC: 31.9 G/DL (ref 31.5–35.7)
MCV RBC AUTO: 88 FL (ref 79–97)
MONOCYTES # BLD AUTO: 0.7 X10E3/UL (ref 0.1–0.9)
MONOCYTES NFR BLD AUTO: 6 %
NEUTROPHILS # BLD AUTO: 7.3 X10E3/UL (ref 1.4–7)
NEUTROPHILS NFR BLD AUTO: 67 %
PLATELET # BLD AUTO: 383 X10E3/UL (ref 150–450)
POTASSIUM SERPL-SCNC: 4.8 MMOL/L (ref 3.5–5.2)
PROT SERPL-MCNC: 6.9 G/DL (ref 6–8.5)
RBC # BLD AUTO: 4.51 X10E6/UL (ref 3.77–5.28)
SODIUM SERPL-SCNC: 139 MMOL/L (ref 134–144)
TRIGL SERPL-MCNC: 101 MG/DL (ref 0–149)
VLDLC SERPL CALC-MCNC: 18 MG/DL (ref 5–40)
WBC # BLD AUTO: 11 X10E3/UL (ref 3.4–10.8)

## 2024-03-10 DIAGNOSIS — G89.29 CHRONIC PAIN OF LEFT KNEE: ICD-10-CM

## 2024-03-10 DIAGNOSIS — M54.50 LUMBAR BACK PAIN: ICD-10-CM

## 2024-03-10 DIAGNOSIS — G89.29 CHRONIC PAIN OF RIGHT KNEE: ICD-10-CM

## 2024-03-10 DIAGNOSIS — M25.561 CHRONIC PAIN OF RIGHT KNEE: ICD-10-CM

## 2024-03-10 DIAGNOSIS — M25.562 CHRONIC PAIN OF LEFT KNEE: ICD-10-CM

## 2024-03-11 RX ORDER — TRAMADOL HYDROCHLORIDE 50 MG/1
50 TABLET ORAL EVERY 6 HOURS PRN
Qty: 60 TABLET | Refills: 5 | Status: SHIPPED | OUTPATIENT
Start: 2024-03-11

## 2024-03-25 RX ORDER — MELOXICAM 15 MG/1
15 TABLET ORAL DAILY
Qty: 30 TABLET | Refills: 2 | Status: SHIPPED | OUTPATIENT
Start: 2024-03-25

## 2024-04-10 DIAGNOSIS — M25.561 CHRONIC PAIN OF RIGHT KNEE: ICD-10-CM

## 2024-04-10 DIAGNOSIS — M25.562 CHRONIC PAIN OF LEFT KNEE: ICD-10-CM

## 2024-04-10 DIAGNOSIS — G89.29 CHRONIC PAIN OF RIGHT KNEE: ICD-10-CM

## 2024-04-10 DIAGNOSIS — G62.9 NEUROPATHY: ICD-10-CM

## 2024-04-10 DIAGNOSIS — G89.29 CHRONIC PAIN OF LEFT KNEE: ICD-10-CM

## 2024-04-10 DIAGNOSIS — M54.50 LUMBAR BACK PAIN: ICD-10-CM

## 2024-04-10 RX ORDER — GABAPENTIN 600 MG/1
600 TABLET ORAL 3 TIMES DAILY
Qty: 90 TABLET | Refills: 2 | Status: SHIPPED | OUTPATIENT
Start: 2024-04-10

## 2024-04-10 RX ORDER — TIZANIDINE 4 MG/1
4-8 TABLET ORAL NIGHTLY PRN
Qty: 60 TABLET | Refills: 2 | Status: SHIPPED | OUTPATIENT
Start: 2024-04-10

## 2024-04-10 NOTE — TELEPHONE ENCOUNTER
========================  Cristiano reviewed 4/10/2024 . Follow up appt is scheduled on Visit date not found .    Last office visit with me : 2/15/2024

## 2024-05-15 ENCOUNTER — OFFICE VISIT (OUTPATIENT)
Dept: FAMILY MEDICINE CLINIC | Facility: CLINIC | Age: 59
End: 2024-05-15
Payer: MEDICARE

## 2024-05-15 VITALS
HEIGHT: 65 IN | SYSTOLIC BLOOD PRESSURE: 130 MMHG | BODY MASS INDEX: 40.25 KG/M2 | WEIGHT: 241.6 LBS | TEMPERATURE: 97.3 F | OXYGEN SATURATION: 95 % | HEART RATE: 88 BPM | RESPIRATION RATE: 16 BRPM | DIASTOLIC BLOOD PRESSURE: 74 MMHG

## 2024-05-15 DIAGNOSIS — Z12.4 PAP SMEAR FOR CERVICAL CANCER SCREENING: ICD-10-CM

## 2024-05-15 DIAGNOSIS — E66.01 MORBID (SEVERE) OBESITY DUE TO EXCESS CALORIES: ICD-10-CM

## 2024-05-15 DIAGNOSIS — F33.0 MILD EPISODE OF RECURRENT MAJOR DEPRESSIVE DISORDER: ICD-10-CM

## 2024-05-15 DIAGNOSIS — Z00.00 MEDICARE ANNUAL WELLNESS VISIT, SUBSEQUENT: Primary | ICD-10-CM

## 2024-05-15 PROCEDURE — 99214 OFFICE O/P EST MOD 30 MIN: CPT | Performed by: FAMILY MEDICINE

## 2024-05-15 PROCEDURE — G0439 PPPS, SUBSEQ VISIT: HCPCS | Performed by: FAMILY MEDICINE

## 2024-05-15 PROCEDURE — 1125F AMNT PAIN NOTED PAIN PRSNT: CPT | Performed by: FAMILY MEDICINE

## 2024-05-15 PROCEDURE — 3075F SYST BP GE 130 - 139MM HG: CPT | Performed by: FAMILY MEDICINE

## 2024-05-15 PROCEDURE — 3288F FALL RISK ASSESSMENT DOCD: CPT | Performed by: FAMILY MEDICINE

## 2024-05-15 PROCEDURE — 1160F RVW MEDS BY RX/DR IN RCRD: CPT | Performed by: FAMILY MEDICINE

## 2024-05-15 PROCEDURE — 3078F DIAST BP <80 MM HG: CPT | Performed by: FAMILY MEDICINE

## 2024-05-15 PROCEDURE — 1170F FXNL STATUS ASSESSED: CPT | Performed by: FAMILY MEDICINE

## 2024-05-15 PROCEDURE — 1159F MED LIST DOCD IN RCRD: CPT | Performed by: FAMILY MEDICINE

## 2024-05-15 RX ORDER — DULOXETIN HYDROCHLORIDE 30 MG/1
30 CAPSULE, DELAYED RELEASE ORAL DAILY
Qty: 90 CAPSULE | Refills: 0 | Status: SHIPPED | OUTPATIENT
Start: 2024-05-15

## 2024-05-15 NOTE — PROGRESS NOTES
The ABCs of the Annual Wellness Visit  Subsequent Medicare Wellness Visit    Subjective    Jordyn Machado is a 59 y.o. female who presents for a Subsequent Medicare Wellness Visit.    The following portions of the patient's history were reviewed and   updated as appropriate: allergies, current medications, past family history, past medical history, past social history, past surgical history, and problem list.    Compared to one year ago, the patient feels her physical   health is worse.    Compared to one year ago, the patient feels her mental   health is worse.    Recent Hospitalizations:  She was not admitted to the hospital during the last year.       Current Medical Providers:  Patient Care Team:  Dl Lucas MD as PCP - General (Family Medicine)  Timmy Chavira MD as Consulting Physician (Orthopedic Surgery)    Outpatient Medications Prior to Visit   Medication Sig Dispense Refill    fluticasone (FLONASE) 50 MCG/ACT nasal spray 2 sprays into the nostril(s) as directed by provider Daily. 16 g 1    gabapentin (NEURONTIN) 600 MG tablet Take 1 tablet by mouth 3 (Three) Times a Day. 90 tablet 2    lisinopril-hydrochlorothiazide (PRINZIDE,ZESTORETIC) 20-12.5 MG per tablet TAKE 1 TABLET BY MOUTH DAILY 90 tablet 1    meloxicam (MOBIC) 15 MG tablet Take 1 tablet by mouth Daily. 30 tablet 2    tiZANidine (ZANAFLEX) 4 MG tablet Take 1-2 tablets by mouth At Night As Needed for Muscle Spasms. 60 tablet 2    traMADol (ULTRAM) 50 MG tablet Take 1 tablet by mouth Every 6 (Six) Hours As Needed for Moderate Pain. 60 tablet 5     No facility-administered medications prior to visit.       Opioid medication/s are on active medication list.  and I have evaluated her active treatment plan and pain score trends (see table).  Vitals:    05/15/24 1545   PainSc:   6   PainLoc: Back  Comment: lower back, both knees, left hip     I have reviewed the chart for potential of high risk medication and harmful drug interactions in the  "elderly.          Aspirin is not on active medication list.  Aspirin use is not indicated based on review of current medical condition/s. Risk of harm outweighs potential benefits.  .    Patient Active Problem List   Diagnosis    Chronic pain of right knee    Lumbar back pain    Left hip pain    Essential hypertension    Abnormal EKG    Obesity (BMI 30-39.9)    Pre-operative cardiovascular examination    Tibia fracture     Advance Care Planning   Advance Care Planning     Advance Directive is not on file.  ACP discussion was held with the patient during this visit. Patient does not have an advance directive, information provided.     Objective    Vitals:    05/15/24 1545   BP: 130/74   Pulse: 88   Resp: 16   Temp: 97.3 °F (36.3 °C)   SpO2: 95%   Weight: 110 kg (241 lb 9.6 oz)   Height: 165.1 cm (65\")   PainSc:   6   PainLoc: Back  Comment: lower back, both knees, left hip     Estimated body mass index is 40.2 kg/m² as calculated from the following:    Height as of this encounter: 165.1 cm (65\").    Weight as of this encounter: 110 kg (241 lb 9.6 oz).    Class 3 Severe Obesity (BMI >=40). Obesity-related health conditions include the following: hypertension. Obesity is unchanged. BMI is is above average; BMI management plan is completed. We discussed Information on healthy weight added to patient's after visit summary.      Does the patient have evidence of cognitive impairment? No          HEALTH RISK ASSESSMENT    Smoking Status:  Social History     Tobacco Use   Smoking Status Never    Passive exposure: Never   Smokeless Tobacco Never     Alcohol Consumption:  Social History     Substance and Sexual Activity   Alcohol Use Never    Comment: occasional      Fall Risk Screen:    STEADI Fall Risk Assessment was completed, and patient is at LOW risk for falls.Assessment completed on:5/15/2024    Depression Screenin/15/2024     3:47 PM   PHQ-2/PHQ-9 Depression Screening   Little Interest or Pleasure in Doing " Things 1-->several days   Feeling Down, Depressed or Hopeless 3-->nearly every day   Trouble Falling or Staying Asleep, or Sleeping Too Much 0-->not at all   Feeling Tired or Having Little Energy 1-->several days   Poor Appetite or Overeating 1-->several days   Feeling Bad about Yourself - or that You are a Failure or Have Let Yourself or Your Family Down 1-->several days   Trouble Concentrating on Things, Such as Reading the Newspaper or Watching Television 1-->several days   Moving or Speaking So Slowly that Other People Could Have Noticed? Or the Opposite - Being So Fidgety 0-->not at all   Thoughts that You Would be Better Off Dead or of Hurting Yourself in Some Way 0-->not at all   PHQ-9: Brief Depression Severity Measure Score 8   If You Checked Off Any Problems, How Difficult Have These Problems Made It For You to Do Your Work, Take Care of Things at Home, or Get Along with Other People? somewhat difficult       Health Habits and Functional and Cognitive Screenin/15/2024     3:46 PM   Functional & Cognitive Status   Do you have difficulty preparing food and eating? Yes   Do you have difficulty bathing yourself, getting dressed or grooming yourself? Yes   Do you have difficulty using the toilet? No   Do you have difficulty moving around from place to place? Yes   Do you have trouble with steps or getting out of a bed or a chair? Yes   Current Diet Unhealthy Diet   Dental Exam Up to date   Eye Exam Not up to date   Exercise (times per week) 0 times per week   Current Exercises Include No Regular Exercise   Do you need help using the phone?  No   Are you deaf or do you have serious difficulty hearing?  No   Do you need help to go to places out of walking distance? Yes   Do you need help shopping? Yes   Do you need help preparing meals?  Yes   Do you need help with housework?  Yes   Do you need help with laundry? Yes   Do you need help taking your medications? No   Do you need help managing money? No    Do you ever drive or ride in a car without wearing a seat belt? No   Have you felt unusual stress, anger or loneliness in the last month? Yes   Who do you live with? Alone   If you need help, do you have trouble finding someone available to you? Yes   Have you been bothered in the last four weeks by sexual problems? No   Do you have difficulty concentrating, remembering or making decisions? Yes       Age-appropriate Screening Schedule:  Refer to the list below for future screening recommendations based on patient's age, sex and/or medical conditions. Orders for these recommended tests are listed in the plan section. The patient has been provided with a written plan.    Health Maintenance   Topic Date Due    COLORECTAL CANCER SCREENING  Never done    TDAP/TD VACCINES (1 - Tdap) Never done    HEPATITIS C SCREENING  Never done    MAMMOGRAM  11/14/2024 (Originally 1965)    INFLUENZA VACCINE  08/01/2024    LIPID PANEL  02/15/2025    ANNUAL WELLNESS VISIT  05/15/2025    PAP SMEAR  05/15/2025    BMI FOLLOWUP  05/15/2025    Pneumococcal Vaccine 0-64  Aged Out    COVID-19 Vaccine  Discontinued    ZOSTER VACCINE  Discontinued                  CMS Preventative Services Quick Reference  Risk Factors Identified During Encounter  Depression/Dysphoria: Prescribed new antidepressant medication treatment. Follow up visit planned.  The above risks/problems have been discussed with the patient.  Pertinent information has been shared with the patient in the After Visit Summary.  An After Visit Summary and PPPS were made available to the patient.    Follow Up:   Next Medicare Wellness visit to be scheduled in 1 year.       Additional E&M Note during same encounter follows:  Patient has multiple medical problems which are significant and separately identifiable that require additional work above and beyond the Medicare Wellness Visit.      Chief Complaint  Medicare Wellness-subsequent    Subjective        History of Present  "Illness  The patient presents for an annual exam and Pap smear.    The patient's last consultation with her PCP Dr. Lucas was in 02/2024. She is current with her dental examinations, however, she has not undergone an eye examination within the past year. She has no history of diabetes. She reports difficulty in performing household tasks, such as sweeping or mopping, due to her scoliosis. She utilizes a chair for support. She has self-adjusted her gabapentin dosage to 300 mg, divided into half, in an effort to discontinue its use. She has Cologuard kit, but has never undergone a mammogram. She experiences hot flashes, for which she takes black cohosh, which has significantly alleviated her symptoms.    Needs to update pap smear today. She reports that she has previously undergone a procedure on her cervix in [place], years ago (LEEP?)    The patient acknowledges feelings of depression, which have not shown improvement. She is not currently on any medication for depression. Dr. Lucas had previously prescribed Celexa, which unfortunately exacerbated her symptoms. She has requested medication for her nerves, but was advised to consult a psychiatrist.        Objective   Vital Signs:  /74   Pulse 88   Temp 97.3 °F (36.3 °C)   Resp 16   Ht 165.1 cm (65\")   Wt 110 kg (241 lb 9.6 oz)   SpO2 95%   BMI 40.20 kg/m²     Physical Exam  Vitals reviewed.   Constitutional:       Appearance: She is obese.   Cardiovascular:      Rate and Rhythm: Normal rate.      Heart sounds: Normal heart sounds.   Pulmonary:      Effort: Pulmonary effort is normal.      Breath sounds: Normal breath sounds.   Chest:   Breasts:     Right: Normal. No mass, nipple discharge, skin change or tenderness.      Left: Normal. No mass, nipple discharge, skin change or tenderness.   Genitourinary:     Labia:         Right: No lesion.         Left: No lesion.       Vagina: Normal.      Cervix: Normal.      Uterus: Normal.       Adnexa: Right " adnexa normal.      Comments: Cervical os difficult to penetrate, she has had previous procedure on cervix (LEEP?)  Neurological:      Mental Status: She is alert.        Physical Exam            Results               Assessment and Plan   Diagnoses and all orders for this visit:    1. Medicare annual wellness visit, subsequent (Primary)    2. Mild episode of recurrent major depressive disorder  -     DULoxetine (CYMBALTA) 30 MG capsule; Take 1 capsule by mouth Daily.  Dispense: 90 capsule; Refill: 0    3. Pap smear for cervical cancer screening  -     LIQUID-BASED PAP SMEAR, [facility] ([facility]); Future  -     LIQUID-BASED PAP SMEAR, [facility] ([facility])    4. Morbid (severe) obesity due to excess calories    5. Body mass index (BMI) of 40.0 to 44.9 in adult      Assessment & Plan  Pap smear completed today.    A prescription for duloxetine 30 mg, to be taken once daily in the evening, was issued. The patient was informed about the potential side effects, risks, and benefits. She was advised to discontinue the medication and report any mood changes.    Follow-up  The patient is scheduled for a follow-up visit with Dr. Lucas in 3 months.         Follow Up   Return in about 3 months (around 8/15/2024) for Recheck depression with PCP .  Patient was given instructions and counseling regarding her condition or for health maintenance advice. Please see specific information pulled into the AVS if appropriate.     Patient or patient representative verbalized consent for the use of Ambient Listening during the visit with  Lizet Gallagher DO for chart documentation. 5/15/2024  19:24 EDT

## 2024-05-15 NOTE — PATIENT INSTRUCTIONS
Medicare Wellness  Personal Prevention Plan of Service     Date of Office Visit:    Encounter Provider:  Lizet Gallagher DO  Place of Service:  [facility]  Patient Name: Jordyn Machado  :  1965    As part of the Medicare Wellness portion of your visit today, we are providing you with this personalized preventive plan of services (PPPS). This plan is based upon recommendations of the [place] Preventive Services Task Force (USPSTF) and the Advisory Committee on Immunization Practices (ACIP).    This lists the preventive care services that should be considered, and provides dates of when you are due. Items listed as completed are up-to-date and do not require any further intervention.    Health Maintenance   Topic Date Due    COLORECTAL CANCER SCREENING  Never done    TDAP/TD VACCINES (1 - Tdap) Never done    HEPATITIS C SCREENING  Never done    BMI FOLLOWUP  2023    MAMMOGRAM  2024 (Originally 1965)    INFLUENZA VACCINE  2024    LIPID PANEL  02/15/2025    ANNUAL WELLNESS VISIT  05/15/2025    PAP SMEAR  05/15/2025    Pneumococcal Vaccine 0-64  Aged Out    COVID-19 Vaccine  Discontinued    ZOSTER VACCINE  Discontinued       No orders of the defined types were placed in this encounter.      Return in about 3 months (around 8/15/2024) for Recheck depression with PCP .

## 2024-05-16 DIAGNOSIS — F41.9 ANXIETY: Primary | ICD-10-CM

## 2024-05-16 RX ORDER — DIAZEPAM 5 MG/1
5 TABLET ORAL EVERY 12 HOURS PRN
Qty: 30 TABLET | Refills: 0 | Status: SHIPPED | OUTPATIENT
Start: 2024-05-16

## 2024-05-21 LAB — REF LAB TEST METHOD: NORMAL

## 2024-05-30 DIAGNOSIS — F41.9 ANXIETY: ICD-10-CM

## 2024-05-31 RX ORDER — DIAZEPAM 5 MG/1
5 TABLET ORAL EVERY 12 HOURS PRN
Qty: 30 TABLET | Refills: 0 | Status: SHIPPED | OUTPATIENT
Start: 2024-05-31

## 2024-05-31 NOTE — TELEPHONE ENCOUNTER
See my chart message      ========================  Cristiano reviewed 5/31/2024 . Follow up appt is scheduled on 8/15/2024 .    Last office visit with me : 2/15/2024

## 2024-06-03 DIAGNOSIS — G89.29 CHRONIC PAIN OF LEFT KNEE: ICD-10-CM

## 2024-06-03 DIAGNOSIS — G89.29 CHRONIC PAIN OF RIGHT KNEE: ICD-10-CM

## 2024-06-03 DIAGNOSIS — M25.561 CHRONIC PAIN OF RIGHT KNEE: ICD-10-CM

## 2024-06-03 DIAGNOSIS — M25.562 CHRONIC PAIN OF LEFT KNEE: ICD-10-CM

## 2024-06-03 DIAGNOSIS — M54.50 LUMBAR BACK PAIN: ICD-10-CM

## 2024-06-03 RX ORDER — TRAMADOL HYDROCHLORIDE 50 MG/1
50 TABLET ORAL EVERY 6 HOURS PRN
Qty: 60 TABLET | Refills: 5 | Status: SHIPPED | OUTPATIENT
Start: 2024-06-03

## 2024-06-03 NOTE — TELEPHONE ENCOUNTER
========================  Cristiano reviewed 6/3/2024 . Follow up appt is scheduled on 8/15/2024 .    Last office visit with me : 2/15/2024

## 2024-06-15 DIAGNOSIS — F41.9 ANXIETY: ICD-10-CM

## 2024-06-17 RX ORDER — DIAZEPAM 5 MG/1
5 TABLET ORAL EVERY 12 HOURS PRN
Qty: 30 TABLET | Refills: 2 | Status: SHIPPED | OUTPATIENT
Start: 2024-06-17

## 2024-06-17 NOTE — TELEPHONE ENCOUNTER
========================  Cristiano reviewed 6/17/2024 . Follow up appt is scheduled on 8/2/2024 .    Last office visit with me : 2/15/2024

## 2024-07-08 DIAGNOSIS — G62.9 NEUROPATHY: ICD-10-CM

## 2024-07-08 DIAGNOSIS — M25.562 CHRONIC PAIN OF LEFT KNEE: ICD-10-CM

## 2024-07-08 DIAGNOSIS — G89.29 CHRONIC PAIN OF LEFT KNEE: ICD-10-CM

## 2024-07-08 RX ORDER — GABAPENTIN 600 MG/1
600 TABLET ORAL 3 TIMES DAILY
Qty: 90 TABLET | Refills: 0 | Status: SHIPPED | OUTPATIENT
Start: 2024-07-08

## 2024-07-08 NOTE — TELEPHONE ENCOUNTER
========================  Cristiano reviewed 7/8/2024 . Follow up appt is scheduled on 8/2/2024 .    Last office visit with me : 2/15/2024

## 2024-07-12 RX ORDER — MELOXICAM 15 MG/1
15 TABLET ORAL DAILY
Qty: 30 TABLET | Refills: 2 | Status: SHIPPED | OUTPATIENT
Start: 2024-07-12

## 2024-07-27 DIAGNOSIS — F41.9 ANXIETY: ICD-10-CM

## 2024-07-29 RX ORDER — DIAZEPAM 5 MG/1
5 TABLET ORAL EVERY 12 HOURS PRN
Qty: 30 TABLET | Refills: 0 | Status: SHIPPED | OUTPATIENT
Start: 2024-07-29

## 2024-07-29 NOTE — TELEPHONE ENCOUNTER
========================  Cristiano reviewed 7/29/2024 . Follow up appt is scheduled on 8/2/2024 .    Last office visit with me : 2/15/2024

## 2024-07-30 RX ORDER — LISINOPRIL AND HYDROCHLOROTHIAZIDE 20; 12.5 MG/1; MG/1
1 TABLET ORAL DAILY
Qty: 90 TABLET | Refills: 0 | Status: SHIPPED | OUTPATIENT
Start: 2024-07-30

## 2024-07-31 RX ORDER — LISINOPRIL AND HYDROCHLOROTHIAZIDE 20; 12.5 MG/1; MG/1
1 TABLET ORAL DAILY
Qty: 90 TABLET | Refills: 0 | OUTPATIENT
Start: 2024-07-31

## 2024-08-02 ENCOUNTER — OFFICE VISIT (OUTPATIENT)
Dept: FAMILY MEDICINE CLINIC | Facility: CLINIC | Age: 59
End: 2024-08-02
Payer: MEDICARE

## 2024-08-02 VITALS
TEMPERATURE: 97.6 F | DIASTOLIC BLOOD PRESSURE: 86 MMHG | HEIGHT: 65 IN | OXYGEN SATURATION: 98 % | WEIGHT: 258 LBS | HEART RATE: 76 BPM | SYSTOLIC BLOOD PRESSURE: 126 MMHG | BODY MASS INDEX: 42.99 KG/M2 | RESPIRATION RATE: 16 BRPM

## 2024-08-02 DIAGNOSIS — M25.562 CHRONIC PAIN OF LEFT KNEE: ICD-10-CM

## 2024-08-02 DIAGNOSIS — F41.9 ANXIETY: Primary | ICD-10-CM

## 2024-08-02 DIAGNOSIS — I10 ESSENTIAL HYPERTENSION: ICD-10-CM

## 2024-08-02 DIAGNOSIS — G62.9 NEUROPATHY: ICD-10-CM

## 2024-08-02 DIAGNOSIS — R45.89 DEPRESSED MOOD: ICD-10-CM

## 2024-08-02 DIAGNOSIS — G89.29 CHRONIC PAIN OF LEFT KNEE: ICD-10-CM

## 2024-08-02 PROCEDURE — 1160F RVW MEDS BY RX/DR IN RCRD: CPT | Performed by: FAMILY MEDICINE

## 2024-08-02 PROCEDURE — 3074F SYST BP LT 130 MM HG: CPT | Performed by: FAMILY MEDICINE

## 2024-08-02 PROCEDURE — 99214 OFFICE O/P EST MOD 30 MIN: CPT | Performed by: FAMILY MEDICINE

## 2024-08-02 PROCEDURE — 1125F AMNT PAIN NOTED PAIN PRSNT: CPT | Performed by: FAMILY MEDICINE

## 2024-08-02 PROCEDURE — 1159F MED LIST DOCD IN RCRD: CPT | Performed by: FAMILY MEDICINE

## 2024-08-02 PROCEDURE — 3079F DIAST BP 80-89 MM HG: CPT | Performed by: FAMILY MEDICINE

## 2024-08-02 RX ORDER — GABAPENTIN 100 MG/1
100 CAPSULE ORAL 3 TIMES DAILY
Qty: 90 CAPSULE | Refills: 2 | Status: SHIPPED | OUTPATIENT
Start: 2024-08-02

## 2024-08-02 RX ORDER — ESCITALOPRAM OXALATE 10 MG/1
10 TABLET ORAL DAILY
Qty: 30 TABLET | Refills: 3 | Status: SHIPPED | OUTPATIENT
Start: 2024-08-02

## 2024-08-02 NOTE — PROGRESS NOTES
Chief Complaint  3mo f/u chronic pain    Subjective          Jordyn Machado presents to [facility]    HPI         The patient is a 59-year-old female who is here for follow-up.    The patient reports an overall good health status, with no significant discomfort. She is currently on a regimen of diazepam, administered twice daily. She typically administers this medication at night due to its sedative effects. Her anxiety is currently well-managed. She has discontinued duloxetine due to adverse effects. She expresses a desire for a medication in conjunction with Valium to manage her stress and anxiety levels. She has a history of depression, which she attributes to family issues. She was previously on Wellbutrin in 2020 and believes she may have taken Celexa in the past.    The patient no longer requires gabapentin tablets, but requires capsules. She is currently on a regimen of 150 mg twice daily of gabapentin.    The patient's knee is not currently causing significant discomfort. She experienced a fall last night while moving furniture. She applies Bengay or Biofreeze to her back and knees each morning. She experiences numbness in her upper leg after prolonged standing. She continues to take tramadol as needed.    The patient plans to  her antihypertensive medication today.    The patient underwent a colon cancer screening in 11/2023. She reports normal bowel movements and denies any pain or blood in her stool.    Supplemental Information  She had cervical cancer.       OTHER NOTES:          Review of Systems   Respiratory: Negative.     Cardiovascular: Negative.    Gastrointestinal: Negative.    Psychiatric/Behavioral:  Positive for depressed mood. Negative for suicidal ideas. The patient is nervous/anxious.    All other systems reviewed and are negative.       Objective       Vital Signs:   /86   Pulse 76   Temp 97.6 °F (36.4 °C)   Resp 16   Ht 165.1 cm  "(65\")   Wt 117 kg (258 lb)   SpO2 98%   BMI 42.93 kg/m²     Physical Exam  Vitals and nursing note reviewed.   Constitutional:       General: She is not in acute distress.     Appearance: Normal appearance. She is well-developed. She is not ill-appearing.   HENT:      Head: Normocephalic and atraumatic.      Right Ear: Hearing and external ear normal.      Left Ear: Hearing and external ear normal.   Eyes:      Conjunctiva/sclera: Conjunctivae normal.      Pupils: Pupils are equal, round, and reactive to light.   Cardiovascular:      Rate and Rhythm: Normal rate and regular rhythm.      Heart sounds: Normal heart sounds. No murmur heard.     No friction rub.   Pulmonary:      Effort: Pulmonary effort is normal. No respiratory distress.      Breath sounds: Normal breath sounds. No wheezing or rales.   Musculoskeletal:      Right lower leg: No edema.      Left lower leg: Edema (trace) present.      Comments: Mild swelling left knee   Skin:     General: Skin is warm.   Neurological:      Mental Status: She is alert.   Psychiatric:         Mood and Affect: Mood normal.         Behavior: Behavior normal.             Lungs have a good sound.       Result Review :            Other Results    Results               Assessment and Plan    Diagnoses and all orders for this visit:    1. Anxiety (Primary)    2. Depressed mood    3. Neuropathy    4. Chronic pain of left knee    5. Essential hypertension               DISCUSSION       1. Follow-up visit.  The patient is advised to persist with diazepam as required. Lexapro 10 mg daily will be added to her regimen. She has previously found Lexapro beneficial. A follow-up appointment is scheduled for 3 months from now, with the provision for an earlier visit should any issues arise.    2. Chronic pain with neuropathy of the left lower extremity.  The patient will maintain her current regimen of gabapentin, but expresses a desire to reduce the dose. The dosage will be reduced to " 100 mg thrice daily. Currently, she is on a regimen of 150 mg daily. A follow-up will be scheduled in 3 months.    3. Colon cancer screening.  The patient has chosen not to undergo a colonoscopy or Cologuard at this time. The risks of not screening were thoroughly explained to her, and she expressed comprehension.    4. Hypertension.  The patient's blood pressure is currently stable with her current medications.    5. Depressed mood.  Lexapro will be initiated, and a follow-up appointment is scheduled for 3 months from now.    Follow-up  A follow-up visit is scheduled for 3 months from now.       Cristiano dated 8/2/2024  was reviewed and appropriate.     Follow Up   Return in about 3 months (around 11/2/2024).    Patient was given instructions and counseling regarding her condition or for health maintenance advice. Please see specific information pulled into the AVS if appropriate.       Dl Lucas MD    Patient or patient representative verbalized consent for the use of Ambient Listening during the visit with  Dl Lucas MD for chart documentation. 8/2/2024  15:57 EDT

## 2024-08-10 DIAGNOSIS — F41.9 ANXIETY: ICD-10-CM

## 2024-08-12 RX ORDER — DIAZEPAM 5 MG/1
5 TABLET ORAL EVERY 12 HOURS PRN
Qty: 30 TABLET | Refills: 3 | Status: SHIPPED | OUTPATIENT
Start: 2024-08-12

## 2024-08-12 NOTE — TELEPHONE ENCOUNTER
========================  Cristiano reviewed 8/12/2024 . Follow up appt is scheduled on 11/4/2024 .    Last office visit with me : 8/2/2024

## 2024-08-13 DIAGNOSIS — R45.89 DEPRESSED MOOD: ICD-10-CM

## 2024-08-13 DIAGNOSIS — F41.9 ANXIETY: Primary | ICD-10-CM

## 2024-08-13 RX ORDER — CITALOPRAM HYDROBROMIDE 10 MG/1
10 TABLET ORAL DAILY
Qty: 30 TABLET | Refills: 2 | Status: SHIPPED | OUTPATIENT
Start: 2024-08-13

## 2024-08-25 DIAGNOSIS — M54.50 LUMBAR BACK PAIN: ICD-10-CM

## 2024-08-25 DIAGNOSIS — M25.562 CHRONIC PAIN OF LEFT KNEE: ICD-10-CM

## 2024-08-25 DIAGNOSIS — M25.561 CHRONIC PAIN OF RIGHT KNEE: ICD-10-CM

## 2024-08-25 DIAGNOSIS — G89.29 CHRONIC PAIN OF LEFT KNEE: ICD-10-CM

## 2024-08-25 DIAGNOSIS — G89.29 CHRONIC PAIN OF RIGHT KNEE: ICD-10-CM

## 2024-08-26 NOTE — TELEPHONE ENCOUNTER
Called [facility]     HUB RELAY  Does patient have enough Tramadol to get til Wed or Thurs?

## 2024-08-26 NOTE — TELEPHONE ENCOUNTER
Name: Jordyn Machado      Relationship: Self      Best Callback Number: [phone]       HUB PROVIDED THE RELAY MESSAGE FROM THE OFFICE      PATIENT: VOICED UNDERSTANDING AND HAS NO FURTHER QUESTIONS AT THIS TIME    ADDITIONAL INFORMATION: SHE WILL BE OUT AFTER TODAY. THEY USUALLY FILL IT ON TUESDAYS AT [facility].

## 2024-08-27 RX ORDER — TRAMADOL HYDROCHLORIDE 50 MG/1
50 TABLET ORAL EVERY 6 HOURS PRN
Qty: 60 TABLET | Refills: 0 | Status: SHIPPED | OUTPATIENT
Start: 2024-08-27

## 2024-09-07 DIAGNOSIS — M54.50 LUMBAR BACK PAIN: ICD-10-CM

## 2024-09-07 DIAGNOSIS — M25.561 CHRONIC PAIN OF RIGHT KNEE: ICD-10-CM

## 2024-09-07 DIAGNOSIS — G89.29 CHRONIC PAIN OF RIGHT KNEE: ICD-10-CM

## 2024-09-07 DIAGNOSIS — M25.562 CHRONIC PAIN OF LEFT KNEE: ICD-10-CM

## 2024-09-07 DIAGNOSIS — G89.29 CHRONIC PAIN OF LEFT KNEE: ICD-10-CM

## 2024-09-09 RX ORDER — TRAMADOL HYDROCHLORIDE 50 MG/1
50 TABLET ORAL EVERY 6 HOURS PRN
Qty: 60 TABLET | Refills: 3 | Status: SHIPPED | OUTPATIENT
Start: 2024-09-09

## 2024-09-09 NOTE — TELEPHONE ENCOUNTER
========================  Cristiano reviewed 9/9/2024 . Follow up appt is 11/4/2024 .    Last office visit with me : 8/2/2024    
home O2

## 2024-10-04 DIAGNOSIS — F41.9 ANXIETY: ICD-10-CM

## 2024-10-07 RX ORDER — DIAZEPAM 5 MG
5 TABLET ORAL EVERY 12 HOURS PRN
Qty: 30 TABLET | Refills: 3 | Status: SHIPPED | OUTPATIENT
Start: 2024-10-07

## 2024-10-07 NOTE — TELEPHONE ENCOUNTER
========================  Cristiano reviewed 10/7/2024 . Follow up appt is scheduled on 11/4/2024 .    Last office visit with me : 8/2/2024

## 2024-10-08 RX ORDER — MELOXICAM 15 MG/1
15 TABLET ORAL DAILY
Qty: 30 TABLET | Refills: 2 | Status: SHIPPED | OUTPATIENT
Start: 2024-10-08

## 2024-10-29 RX ORDER — LISINOPRIL AND HYDROCHLOROTHIAZIDE 12.5; 2 MG/1; MG/1
1 TABLET ORAL DAILY
Qty: 90 TABLET | Refills: 0 | Status: SHIPPED | OUTPATIENT
Start: 2024-10-29

## 2024-11-04 ENCOUNTER — OFFICE VISIT (OUTPATIENT)
Dept: FAMILY MEDICINE CLINIC | Facility: CLINIC | Age: 59
End: 2024-11-04
Payer: MEDICARE

## 2024-11-04 VITALS
HEIGHT: 65 IN | DIASTOLIC BLOOD PRESSURE: 80 MMHG | TEMPERATURE: 97.5 F | HEART RATE: 84 BPM | WEIGHT: 236 LBS | SYSTOLIC BLOOD PRESSURE: 134 MMHG | BODY MASS INDEX: 39.32 KG/M2 | RESPIRATION RATE: 18 BRPM

## 2024-11-04 DIAGNOSIS — R45.89 DEPRESSED MOOD: ICD-10-CM

## 2024-11-04 DIAGNOSIS — M25.562 CHRONIC PAIN OF LEFT KNEE: ICD-10-CM

## 2024-11-04 DIAGNOSIS — M25.561 CHRONIC PAIN OF RIGHT KNEE: ICD-10-CM

## 2024-11-04 DIAGNOSIS — M54.50 LUMBAR BACK PAIN: ICD-10-CM

## 2024-11-04 DIAGNOSIS — G89.29 CHRONIC PAIN OF LEFT KNEE: ICD-10-CM

## 2024-11-04 DIAGNOSIS — G89.29 CHRONIC PAIN OF RIGHT KNEE: ICD-10-CM

## 2024-11-04 DIAGNOSIS — G62.9 NEUROPATHY: ICD-10-CM

## 2024-11-04 DIAGNOSIS — F41.9 ANXIETY: Primary | ICD-10-CM

## 2024-11-04 DIAGNOSIS — I10 ESSENTIAL HYPERTENSION: ICD-10-CM

## 2024-11-04 PROCEDURE — 1160F RVW MEDS BY RX/DR IN RCRD: CPT | Performed by: FAMILY MEDICINE

## 2024-11-04 PROCEDURE — 3079F DIAST BP 80-89 MM HG: CPT | Performed by: FAMILY MEDICINE

## 2024-11-04 PROCEDURE — 3075F SYST BP GE 130 - 139MM HG: CPT | Performed by: FAMILY MEDICINE

## 2024-11-04 PROCEDURE — 1125F AMNT PAIN NOTED PAIN PRSNT: CPT | Performed by: FAMILY MEDICINE

## 2024-11-04 PROCEDURE — 1159F MED LIST DOCD IN RCRD: CPT | Performed by: FAMILY MEDICINE

## 2024-11-04 PROCEDURE — 99214 OFFICE O/P EST MOD 30 MIN: CPT | Performed by: FAMILY MEDICINE

## 2024-11-04 RX ORDER — GABAPENTIN 100 MG/1
100 CAPSULE ORAL 3 TIMES DAILY
Qty: 90 CAPSULE | Refills: 2 | Status: SHIPPED | OUTPATIENT
Start: 2024-11-04

## 2024-11-04 RX ORDER — TRAMADOL HYDROCHLORIDE 50 MG/1
50 TABLET ORAL EVERY 6 HOURS PRN
Qty: 60 TABLET | Refills: 2 | Status: SHIPPED | OUTPATIENT
Start: 2024-11-04

## 2024-11-04 NOTE — PROGRESS NOTES
"Chief Complaint  Anxiety (3 month f/u)    Subjective          Jordyn Machado presents to [facility]    HPI         The patient is a 59-year-old female here for a follow-up visit.    Two weeks ago, she experienced a stomach virus that lasted for approximately three and a half days. She has lost some weight due to these issues.    Her mood is generally stable, and she is currently taking citalopram, half a tablet of 10 mg at night. The effectiveness of this medication varies, sometimes providing relief and sometimes not. She is dealing with significant family issues that are exacerbating her depression. She is concerned about potential side effects of citalopram, as she experienced diarrhea while on Lexapro.    She suffers from sciatica, which worsens during this time of year, causing increased stiffness and numbness. Tramadol and gabapentin provide some relief. She takes gabapentin 100 mg three times a day.    She takes diazepam in the morning with coffee. Initially, it made her feel tired, but she believes her body has adjusted to it.    She reports no chest pain or shortness of breath. However, she has swelling in one foot. Her sleep is satisfactory, aided by Zanaflex taken at bedtime.    She has a history of knee surgery eight years ago.    SOCIAL HISTORY  She does not smoke or drink alcohol.       OTHER NOTES:          Review of Systems   Constitutional: Negative.    Respiratory: Negative.  Negative for shortness of breath.    Cardiovascular: Negative.  Negative for chest pain and leg swelling.   Gastrointestinal: Negative.    Musculoskeletal:  Positive for arthralgias and back pain.   Psychiatric/Behavioral:  Positive for depressed mood. Negative for sleep disturbance. The patient is nervous/anxious.         Objective       Vital Signs:   /80   Pulse 84   Temp 97.5 °F (36.4 °C)   Resp 18   Ht 165.1 cm (65\")   Wt 107 kg (236 lb)   BMI 39.27 kg/m²     Physical " Exam  Vitals and nursing note reviewed.   Constitutional:       General: She is not in acute distress.     Appearance: Normal appearance. She is well-developed. She is not ill-appearing.   HENT:      Head: Normocephalic and atraumatic.      Right Ear: Hearing and external ear normal.      Left Ear: Hearing and external ear normal.      Mouth/Throat:      Mouth: Mucous membranes are moist.      Pharynx: No oropharyngeal exudate or posterior oropharyngeal erythema.   Cardiovascular:      Rate and Rhythm: Normal rate and regular rhythm.      Heart sounds: Normal heart sounds. No murmur heard.     No friction rub.   Pulmonary:      Effort: Pulmonary effort is normal. No respiratory distress.      Breath sounds: Normal breath sounds. No wheezing or rales.   Musculoskeletal:      Right lower leg: No edema.      Left lower leg: No edema.      Comments: Tenderness of the left knee. Jerked during the exam due to pain.    Skin:     General: Skin is warm.   Neurological:      Mental Status: She is alert.   Psychiatric:         Mood and Affect: Mood normal.         Behavior: Behavior normal.             Antalgic gait is present.       Result Review :            Other Results    Results               Assessment and Plan    Diagnoses and all orders for this visit:    1. Anxiety (Primary)    2. Depressed mood    3. Chronic pain of left knee  -     traMADol (ULTRAM) 50 MG tablet; Take 1 tablet by mouth Every 6 (Six) Hours As Needed for Moderate Pain.  Dispense: 60 tablet; Refill: 2  -     gabapentin (NEURONTIN) 100 MG capsule; Take 1 capsule by mouth 3 (Three) Times a Day.  Dispense: 90 capsule; Refill: 2    4. Chronic pain of right knee  -     traMADol (ULTRAM) 50 MG tablet; Take 1 tablet by mouth Every 6 (Six) Hours As Needed for Moderate Pain.  Dispense: 60 tablet; Refill: 2    5. Lumbar back pain  -     traMADol (ULTRAM) 50 MG tablet; Take 1 tablet by mouth Every 6 (Six) Hours As Needed for Moderate Pain.  Dispense: 60 tablet;  Refill: 2    6. Neuropathy  -     gabapentin (NEURONTIN) 100 MG capsule; Take 1 capsule by mouth 3 (Three) Times a Day.  Dispense: 90 capsule; Refill: 2    7. Essential hypertension               DISCUSSION       1. Depression.  She has been taking half of a 10 mg citalopram (Celexa) tablet at night, which equates to 5 mg. She reports mixed results, with some improvement but also significant family stress affecting her mood. She is advised to try taking a full 10 mg tablet to see if it improves her symptoms, as long as it does not cause diarrhea or other side effects.    2. Sciatica.  Her sciatica pain worsens during the colder months, causing stiffness and numbness. She is currently taking gabapentin 100 mg three times a day, which helps with the pain and neuropathy. She prefers this regimen over higher doses. A prescription for gabapentin will be sent to [facility].    3. Knee Pain.  She experiences significant pain in her knee, especially in colder weather. The pain is exacerbated by a previous fracture and multiple surgeries. She is currently taking tramadol, which helps manage the pain. A prescription for tramadol will be sent to [facility].    4. Anxiety.  She takes diazepam in the mornings with her coffee to manage anxiety. It initially made her feel tired, but her body has adjusted. She continues to take one tablet daily.    5. Insomnia.  She takes Zanaflex at bedtime, which helps her sleep.    6. Recent Stomach Virus.  She had a stomach virus about two weeks ago, which caused severe diarrhea but has since resolved.    Medication Management.  Refills for tramadol and gabapentin will be sent to [facility]. She has refills left for diazepam and Mobic.    Follow-up  Return in 3 months for follow up.     Taking tramadol appropriately for pain.  No evidence of misuse or diversion.    Cristiano dated 11/4/2024 was unavailable to be reviewed    Follow Up   Return in about 3 months (around  2/4/2025).    Patient was given instructions and counseling regarding her condition or for health maintenance advice. Please see specific information pulled into the AVS if appropriate.       Dl Lucas MD    Patient or patient representative verbalized consent for the use of Ambient Listening during the visit with  Dl Lucas MD for chart documentation. 11/4/2024  18:02 EST

## 2024-11-08 DIAGNOSIS — M54.50 LUMBAR BACK PAIN: ICD-10-CM

## 2024-11-08 DIAGNOSIS — M25.561 CHRONIC PAIN OF RIGHT KNEE: ICD-10-CM

## 2024-11-08 DIAGNOSIS — G89.29 CHRONIC PAIN OF LEFT KNEE: ICD-10-CM

## 2024-11-08 DIAGNOSIS — G89.29 CHRONIC PAIN OF RIGHT KNEE: ICD-10-CM

## 2024-11-08 DIAGNOSIS — M25.562 CHRONIC PAIN OF LEFT KNEE: ICD-10-CM

## 2024-11-12 ENCOUNTER — PATIENT MESSAGE (OUTPATIENT)
Dept: FAMILY MEDICINE CLINIC | Facility: CLINIC | Age: 59
End: 2024-11-12
Payer: MEDICARE

## 2024-11-27 NOTE — PROGRESS NOTES
Subjective   Jordyn Machado is a 57 y.o. female.     History of Present Illness     Pt with chronic knee pain that she sees Dr. Cornejo for  On gabapenitn and this helps her pain, TID  She also uses tramadol about 4 times a day  On mobic as well    No SE noted with medicine  She does want refills        Review of Systems    Objective   Physical Exam  Vitals and nursing note reviewed.   Constitutional:       General: She is not in acute distress.     Appearance: Normal appearance. She is well-developed.   Cardiovascular:      Rate and Rhythm: Normal rate and regular rhythm.      Heart sounds: Normal heart sounds.   Pulmonary:      Effort: Pulmonary effort is normal.      Breath sounds: Normal breath sounds.   Neurological:      Mental Status: She is alert and oriented to person, place, and time.   Psychiatric:         Mood and Affect: Mood normal.         Behavior: Behavior normal.         Thought Content: Thought content normal.         Judgment: Judgment normal.         Assessment & Plan   Diagnoses and all orders for this visit:    1. Chronic pain of left knee (Primary)  -     gabapentin (NEURONTIN) 600 MG tablet; Take 1 tablet by mouth 3 (Three) Times a Day.  Dispense: 90 tablet; Refill: 1    2. Neuropathy  -     gabapentin (NEURONTIN) 600 MG tablet; Take 1 tablet by mouth 3 (Three) Times a Day.  Dispense: 90 tablet; Refill: 1    3. Screening mammogram for breast cancer  -     Mammo Screening Digital Tomosynthesis Bilateral With CAD; Future    will continue her chronic medicine and have her follow up with PCP  No change in regimen            Comment: Less active acne.  Discussed changes and dehydration symptoms with marguerite.  Discussed risks and side effects.  Offered winlevi v marguerite 50 and staying hydrated v cabtreo or seysara. Patient prefers marguerite 50mand to continue aklief. Render Risk Assessment In Note?: no Detail Level: Simple

## 2024-12-04 DIAGNOSIS — F41.9 ANXIETY: ICD-10-CM

## 2024-12-04 RX ORDER — DIAZEPAM 5 MG/1
5 TABLET ORAL EVERY 12 HOURS PRN
Qty: 30 TABLET | Refills: 3 | Status: CANCELLED | OUTPATIENT
Start: 2024-12-04

## 2024-12-04 RX ORDER — DIAZEPAM 5 MG/1
5 TABLET ORAL EVERY 12 HOURS PRN
Qty: 30 TABLET | Refills: 3 | Status: SHIPPED | OUTPATIENT
Start: 2024-12-04

## 2024-12-04 NOTE — TELEPHONE ENCOUNTER
========================  Cristiano reviewed 12/4/2024 . Follow up appt is scheduled on 2/6/2025 .    Last office visit with me : 11/4/2024

## 2024-12-14 DIAGNOSIS — M54.50 LUMBAR BACK PAIN: ICD-10-CM

## 2024-12-14 DIAGNOSIS — G89.29 CHRONIC PAIN OF RIGHT KNEE: ICD-10-CM

## 2024-12-14 DIAGNOSIS — M25.561 CHRONIC PAIN OF RIGHT KNEE: ICD-10-CM

## 2024-12-14 DIAGNOSIS — G89.29 CHRONIC PAIN OF LEFT KNEE: ICD-10-CM

## 2024-12-14 DIAGNOSIS — M25.562 CHRONIC PAIN OF LEFT KNEE: ICD-10-CM

## 2024-12-16 RX ORDER — TRAMADOL HYDROCHLORIDE 50 MG/1
50 TABLET ORAL EVERY 6 HOURS PRN
Qty: 60 TABLET | Refills: 2 | Status: SHIPPED | OUTPATIENT
Start: 2024-12-16

## 2024-12-16 NOTE — TELEPHONE ENCOUNTER
========================  Cristiano reviewed 12/16/2024 . Follow up appt is scheduled on 2/6/2025 .    Last office visit with me : 11/4/2024

## 2025-01-07 RX ORDER — MELOXICAM 15 MG/1
15 TABLET ORAL DAILY
Qty: 30 TABLET | Refills: 2 | Status: SHIPPED | OUTPATIENT
Start: 2025-01-07

## 2025-01-25 DIAGNOSIS — M54.50 LUMBAR BACK PAIN: ICD-10-CM

## 2025-01-25 DIAGNOSIS — G89.29 CHRONIC PAIN OF RIGHT KNEE: ICD-10-CM

## 2025-01-25 DIAGNOSIS — M25.562 CHRONIC PAIN OF LEFT KNEE: ICD-10-CM

## 2025-01-25 DIAGNOSIS — M25.561 CHRONIC PAIN OF RIGHT KNEE: ICD-10-CM

## 2025-01-25 DIAGNOSIS — G89.29 CHRONIC PAIN OF LEFT KNEE: ICD-10-CM

## 2025-01-27 DIAGNOSIS — M54.50 LUMBAR BACK PAIN: ICD-10-CM

## 2025-01-27 DIAGNOSIS — M25.562 CHRONIC PAIN OF LEFT KNEE: ICD-10-CM

## 2025-01-27 DIAGNOSIS — G89.29 CHRONIC PAIN OF LEFT KNEE: ICD-10-CM

## 2025-01-27 DIAGNOSIS — G89.29 CHRONIC PAIN OF RIGHT KNEE: ICD-10-CM

## 2025-01-27 DIAGNOSIS — M25.561 CHRONIC PAIN OF RIGHT KNEE: ICD-10-CM

## 2025-01-27 RX ORDER — TRAMADOL HYDROCHLORIDE 50 MG/1
50 TABLET ORAL EVERY 6 HOURS PRN
Qty: 60 TABLET | Refills: 2 | Status: SHIPPED | OUTPATIENT
Start: 2025-01-27

## 2025-01-27 RX ORDER — LISINOPRIL AND HYDROCHLOROTHIAZIDE 12.5; 2 MG/1; MG/1
1 TABLET ORAL DAILY
Qty: 90 TABLET | Refills: 0 | Status: SHIPPED | OUTPATIENT
Start: 2025-01-27

## 2025-01-27 NOTE — TELEPHONE ENCOUNTER
========================  Cristiano reviewed 1/27/2025 . Follow up appt is scheduled on 2/6/2025 .    Last office visit with me : 11/4/2024

## 2025-02-02 DIAGNOSIS — F41.9 ANXIETY: ICD-10-CM

## 2025-02-03 DIAGNOSIS — G89.29 CHRONIC PAIN OF LEFT KNEE: ICD-10-CM

## 2025-02-03 DIAGNOSIS — M25.562 CHRONIC PAIN OF LEFT KNEE: ICD-10-CM

## 2025-02-03 DIAGNOSIS — G62.9 NEUROPATHY: ICD-10-CM

## 2025-02-03 DIAGNOSIS — F41.9 ANXIETY: ICD-10-CM

## 2025-02-03 RX ORDER — GABAPENTIN 100 MG/1
100 CAPSULE ORAL 3 TIMES DAILY
Qty: 90 CAPSULE | Refills: 2 | OUTPATIENT
Start: 2025-02-03

## 2025-02-03 RX ORDER — GABAPENTIN 100 MG/1
100 CAPSULE ORAL 3 TIMES DAILY
Qty: 90 CAPSULE | OUTPATIENT
Start: 2025-02-03

## 2025-02-03 RX ORDER — DIAZEPAM 5 MG/1
5 TABLET ORAL EVERY 12 HOURS PRN
Qty: 30 TABLET | Refills: 3 | Status: SHIPPED | OUTPATIENT
Start: 2025-02-03

## 2025-02-03 RX ORDER — DIAZEPAM 5 MG/1
5 TABLET ORAL EVERY 12 HOURS PRN
Qty: 30 TABLET | OUTPATIENT
Start: 2025-02-03

## 2025-02-03 NOTE — TELEPHONE ENCOUNTER
========================  Cristiano reviewed 2/3/2025 . Follow up appt is scheduled on 2/6/2025 .    Last office visit with me : 11/4/2024

## 2025-02-05 DIAGNOSIS — M25.562 CHRONIC PAIN OF LEFT KNEE: ICD-10-CM

## 2025-02-05 DIAGNOSIS — G62.9 NEUROPATHY: ICD-10-CM

## 2025-02-05 DIAGNOSIS — G89.29 CHRONIC PAIN OF LEFT KNEE: ICD-10-CM

## 2025-02-05 RX ORDER — GABAPENTIN 100 MG/1
100 CAPSULE ORAL 3 TIMES DAILY
Qty: 90 CAPSULE | Refills: 2 | Status: SHIPPED | OUTPATIENT
Start: 2025-02-05

## 2025-02-05 NOTE — TELEPHONE ENCOUNTER
========================  Cristiano reviewed 2/5/2025 . Follow up appt is scheduled on 2/6/2025 .    Last office visit with me : 11/4/2024

## 2025-02-06 ENCOUNTER — OFFICE VISIT (OUTPATIENT)
Dept: FAMILY MEDICINE CLINIC | Facility: CLINIC | Age: 60
End: 2025-02-06
Payer: MEDICARE

## 2025-02-06 VITALS
RESPIRATION RATE: 18 BRPM | HEART RATE: 80 BPM | TEMPERATURE: 97.5 F | SYSTOLIC BLOOD PRESSURE: 132 MMHG | DIASTOLIC BLOOD PRESSURE: 80 MMHG | HEIGHT: 65 IN | WEIGHT: 240 LBS | BODY MASS INDEX: 39.99 KG/M2

## 2025-02-06 DIAGNOSIS — K21.9 GASTROESOPHAGEAL REFLUX DISEASE, UNSPECIFIED WHETHER ESOPHAGITIS PRESENT: Primary | ICD-10-CM

## 2025-02-06 DIAGNOSIS — M54.50 LUMBAR BACK PAIN: ICD-10-CM

## 2025-02-06 DIAGNOSIS — I10 ESSENTIAL HYPERTENSION: ICD-10-CM

## 2025-02-06 DIAGNOSIS — G62.9 NEUROPATHY: ICD-10-CM

## 2025-02-06 DIAGNOSIS — M25.562 CHRONIC PAIN OF LEFT KNEE: ICD-10-CM

## 2025-02-06 DIAGNOSIS — G89.29 CHRONIC PAIN OF LEFT KNEE: ICD-10-CM

## 2025-02-06 PROCEDURE — 1125F AMNT PAIN NOTED PAIN PRSNT: CPT | Performed by: FAMILY MEDICINE

## 2025-02-06 PROCEDURE — 99214 OFFICE O/P EST MOD 30 MIN: CPT | Performed by: FAMILY MEDICINE

## 2025-02-06 PROCEDURE — 1159F MED LIST DOCD IN RCRD: CPT | Performed by: FAMILY MEDICINE

## 2025-02-06 PROCEDURE — 3079F DIAST BP 80-89 MM HG: CPT | Performed by: FAMILY MEDICINE

## 2025-02-06 PROCEDURE — 1160F RVW MEDS BY RX/DR IN RCRD: CPT | Performed by: FAMILY MEDICINE

## 2025-02-06 PROCEDURE — 3075F SYST BP GE 130 - 139MM HG: CPT | Performed by: FAMILY MEDICINE

## 2025-02-06 PROCEDURE — G2211 COMPLEX E/M VISIT ADD ON: HCPCS | Performed by: FAMILY MEDICINE

## 2025-02-06 RX ORDER — OMEPRAZOLE 40 MG/1
40 CAPSULE, DELAYED RELEASE ORAL DAILY
Qty: 30 CAPSULE | Refills: 2 | Status: SHIPPED | OUTPATIENT
Start: 2025-02-06

## 2025-02-06 NOTE — PROGRESS NOTES
Chief Complaint  Anxiety (3 month f/u )    Subjective          Jordyn Machado presents to [facility]    HPI         The patient is a 59-year-old female who presents for follow-up.    She reports intermittent heartburn, which she attributes to her habit of reclining after meals due to sciatica. She has found relief with Prilosec but is seeking an alternative that is covered by her insurance. She does not experience any breakthrough pain while on omeprazole. She also reports late-night snacking and subsequent sleep, which occasionally results in waking up with heartburn or reflux, depending on the size of her meal. She manages these symptoms by coughing and swallowing, although she finds the taste unpleasant. She has been using over-the-counter remedies such as milk and baking soda, which have provided some relief. She consumes one cup of coffee in the morning and does not report any associated heartburn. She is not a heavy drinker. She has been attempting weight loss, aiming to reduce her weight to 200 pounds. She had to discontinue her visits to [facility] due to financial constraints. She does not report any chest pain or shortness of breath. She recalls a severe episode of GERD in her early 20s, during which she was advised to avoid tomatoes and acidic foods.    She reports an exacerbation of her sciatica symptoms, which she believes is due to the cold weather. She experienced a limp after rising too quickly from a chair, which she attributes to her left knee being out of alignment. She is currently on gabapentin 100 mg twice daily and tramadol for pain management. She does not require any medication refills at this time.    She has never undergone a mammogram and expresses reluctance to do so. She has had a Pap smear in the past. She declines a colonoscopy, influenza vaccine, COVID-19 vaccine, and shingles vaccine.    SOCIAL HISTORY  - Does not smoke  - Not a big  "drinker    FAMILY HISTORY  - Both parents had cancer    MEDICATIONS  - Current: gabapentin  - Current: tramadol    IMMUNIZATIONS  - Declined influenza vaccine  - Declined COVID-19 vaccine  - Declined shingles vaccine       OTHER NOTES:          Review of Systems   Constitutional:  Negative for chills, diaphoresis, fatigue and fever.   HENT:  Negative for congestion, sore throat and swollen glands.    Respiratory:  Negative for cough and shortness of breath.    Cardiovascular:  Negative for chest pain.   Gastrointestinal:  Negative for abdominal pain, nausea and vomiting.   Genitourinary:  Negative for dysuria.   Musculoskeletal:  Negative for myalgias and neck pain.   Skin:  Negative for rash.   Neurological:  Positive for numbness. Negative for weakness.        Objective       Vital Signs:   /80   Pulse 80   Temp 97.5 °F (36.4 °C)   Resp 18   Ht 165.1 cm (65\")   Wt 109 kg (240 lb)   BMI 39.94 kg/m²     Physical Exam  Vitals and nursing note reviewed.   Constitutional:       General: She is not in acute distress.     Appearance: Normal appearance. She is well-developed. She is not ill-appearing.   HENT:      Head: Normocephalic and atraumatic.      Right Ear: Hearing, tympanic membrane, ear canal and external ear normal.      Left Ear: Hearing, tympanic membrane, ear canal and external ear normal.      Mouth/Throat:      Mouth: Mucous membranes are moist.      Pharynx: No oropharyngeal exudate or posterior oropharyngeal erythema.   Cardiovascular:      Rate and Rhythm: Normal rate and regular rhythm.      Heart sounds: Normal heart sounds. No murmur heard.     No friction rub.   Pulmonary:      Effort: Pulmonary effort is normal. No respiratory distress.      Breath sounds: Normal breath sounds. No wheezing or rales.   Musculoskeletal:      Lumbar back: Tenderness present.      Right knee: No swelling.      Left knee: No swelling. Decreased range of motion. Tenderness present.      Right lower leg: No " edema.      Left lower leg: No edema.      Comments: Antalgic gait   Skin:     General: Skin is warm.   Neurological:      Mental Status: She is alert.   Psychiatric:         Mood and Affect: Mood normal.         Behavior: Behavior normal.           Oral exam was performed.  Lungs were auscultated.  Left knee was examined.    Vital Signs  Weight is 240 pounds. Blood pressure is normal.       Result Review :            Other Results    Results               Assessment and Plan    There are no diagnoses linked to this encounter.           DISCUSSION       Intermittent heartburn.  Her symptoms may be exacerbated by late-night eating and subsequent lying down. Weight loss could potentially alleviate her reflux symptoms. She is advised to avoid caffeine, smoking, alcohol, late-night eating, and lying down immediately after meals. A prescription for omeprazole 40 mg, to be taken once daily 30 minutes before meals, will be sent to [facility]. She is instructed to take this medication for a duration of 12 weeks, after which she should discontinue use and monitor for symptom recurrence.  If symptoms return, may need to see gastroenterology for endoscopy.  If the medication is not covered by her insurance, she will inform the office.    Chronic pain with sciatica and neuropathy and left knee pain.  She is advised to continue her current regimen of gabapentin 100 mg twice daily and tramadol as needed. If her symptoms worsen, she may increase the gabapentin to three times daily.    Health maintenance.  She has declined a mammogram, colonoscopy, influenza vaccine, COVID-19 vaccine, and shingles vaccine. Blood work will be ordered to assess her blood count, liver function, kidney function, blood glucose levels, and cholesterol levels.       Cristiano dated 2/6/2025  was reviewed and appropriate.     Follow Up   No follow-ups on file.    Patient was given instructions and counseling regarding her condition or for health  maintenance advice. Please see specific information pulled into the AVS if appropriate.       Dl Lucas MD    Patient or patient representative verbalized consent for the use of Ambient Listening during the visit with  Dl Lucas MD for chart documentation. 2/6/2025  17:25 EST

## 2025-02-07 LAB
ALBUMIN SERPL-MCNC: 4.5 G/DL (ref 3.8–4.9)
ALP SERPL-CCNC: 66 IU/L (ref 44–121)
ALT SERPL-CCNC: 10 IU/L (ref 0–32)
AST SERPL-CCNC: 15 IU/L (ref 0–40)
BASOPHILS # BLD AUTO: 0.1 X10E3/UL (ref 0–0.2)
BASOPHILS NFR BLD AUTO: 1 %
BILIRUB SERPL-MCNC: 0.3 MG/DL (ref 0–1.2)
BUN SERPL-MCNC: 22 MG/DL (ref 6–24)
BUN/CREAT SERPL: 19 (ref 9–23)
CALCIUM SERPL-MCNC: 9.7 MG/DL (ref 8.7–10.2)
CHLORIDE SERPL-SCNC: 100 MMOL/L (ref 96–106)
CHOLEST SERPL-MCNC: 210 MG/DL (ref 100–199)
CHOLEST/HDLC SERPL: 2.9 RATIO (ref 0–4.4)
CO2 SERPL-SCNC: 26 MMOL/L (ref 20–29)
CREAT SERPL-MCNC: 1.16 MG/DL (ref 0.57–1)
EGFRCR SERPLBLD CKD-EPI 2021: 55 ML/MIN/1.73
EOSINOPHIL # BLD AUTO: 0.1 X10E3/UL (ref 0–0.4)
EOSINOPHIL NFR BLD AUTO: 2 %
ERYTHROCYTE [DISTWIDTH] IN BLOOD BY AUTOMATED COUNT: 12.3 % (ref 11.7–15.4)
GLOBULIN SER CALC-MCNC: 2.7 G/DL (ref 1.5–4.5)
GLUCOSE SERPL-MCNC: 84 MG/DL (ref 70–99)
HCT VFR BLD AUTO: 42.6 % (ref 34–46.6)
HDLC SERPL-MCNC: 72 MG/DL
HGB BLD-MCNC: 13.3 G/DL (ref 11.1–15.9)
IMM GRANULOCYTES # BLD AUTO: 0 X10E3/UL (ref 0–0.1)
IMM GRANULOCYTES NFR BLD AUTO: 0 %
LDLC SERPL CALC-MCNC: 101 MG/DL (ref 0–99)
LYMPHOCYTES # BLD AUTO: 2.5 X10E3/UL (ref 0.7–3.1)
LYMPHOCYTES NFR BLD AUTO: 30 %
MCH RBC QN AUTO: 28.7 PG (ref 26.6–33)
MCHC RBC AUTO-ENTMCNC: 31.2 G/DL (ref 31.5–35.7)
MCV RBC AUTO: 92 FL (ref 79–97)
MONOCYTES # BLD AUTO: 0.5 X10E3/UL (ref 0.1–0.9)
MONOCYTES NFR BLD AUTO: 6 %
NEUTROPHILS # BLD AUTO: 5.3 X10E3/UL (ref 1.4–7)
NEUTROPHILS NFR BLD AUTO: 61 %
PLATELET # BLD AUTO: 322 X10E3/UL (ref 150–450)
POTASSIUM SERPL-SCNC: 4.6 MMOL/L (ref 3.5–5.2)
PROT SERPL-MCNC: 7.2 G/DL (ref 6–8.5)
RBC # BLD AUTO: 4.64 X10E6/UL (ref 3.77–5.28)
SODIUM SERPL-SCNC: 139 MMOL/L (ref 134–144)
TRIGL SERPL-MCNC: 218 MG/DL (ref 0–149)
VLDLC SERPL CALC-MCNC: 37 MG/DL (ref 5–40)
WBC # BLD AUTO: 8.5 X10E3/UL (ref 3.4–10.8)

## 2025-03-10 DIAGNOSIS — M25.561 CHRONIC PAIN OF RIGHT KNEE: ICD-10-CM

## 2025-03-10 DIAGNOSIS — M54.50 LUMBAR BACK PAIN: ICD-10-CM

## 2025-03-10 DIAGNOSIS — G89.29 CHRONIC PAIN OF RIGHT KNEE: ICD-10-CM

## 2025-03-10 DIAGNOSIS — G89.29 CHRONIC PAIN OF LEFT KNEE: ICD-10-CM

## 2025-03-10 DIAGNOSIS — M25.562 CHRONIC PAIN OF LEFT KNEE: ICD-10-CM

## 2025-03-10 RX ORDER — TRAMADOL HYDROCHLORIDE 50 MG/1
50 TABLET ORAL EVERY 6 HOURS PRN
Qty: 60 TABLET | Refills: 2 | Status: SHIPPED | OUTPATIENT
Start: 2025-03-10

## 2025-03-10 RX ORDER — TRAMADOL HYDROCHLORIDE 50 MG/1
50 TABLET ORAL EVERY 6 HOURS PRN
Qty: 60 TABLET | Refills: 2 | Status: CANCELLED | OUTPATIENT
Start: 2025-03-10

## 2025-03-10 NOTE — TELEPHONE ENCOUNTER
========================  Cristiano reviewed 3/10/2025 . Follow up appt is scheduled on 5/8/2025 .    Last office visit with me : 2/6/2025

## 2025-04-01 DIAGNOSIS — F41.9 ANXIETY: ICD-10-CM

## 2025-04-01 RX ORDER — DIAZEPAM 5 MG/1
5 TABLET ORAL EVERY 12 HOURS PRN
Qty: 30 TABLET | Refills: 3 | Status: SHIPPED | OUTPATIENT
Start: 2025-04-01

## 2025-04-01 NOTE — TELEPHONE ENCOUNTER
========================  Cristiano reviewed 4/1/2025 . Follow up appt is scheduled on 5/8/2025 .    Last office visit with me : 2/6/2025

## 2025-04-03 RX ORDER — MELOXICAM 15 MG/1
15 TABLET ORAL DAILY
Qty: 30 TABLET | Refills: 2 | Status: SHIPPED | OUTPATIENT
Start: 2025-04-03

## 2025-04-11 RX ORDER — LISINOPRIL AND HYDROCHLOROTHIAZIDE 12.5; 2 MG/1; MG/1
1 TABLET ORAL DAILY
Qty: 90 TABLET | Refills: 0 | Status: SHIPPED | OUTPATIENT
Start: 2025-04-11

## 2025-04-20 DIAGNOSIS — M25.562 CHRONIC PAIN OF LEFT KNEE: ICD-10-CM

## 2025-04-20 DIAGNOSIS — M54.50 LUMBAR BACK PAIN: ICD-10-CM

## 2025-04-20 DIAGNOSIS — G89.29 CHRONIC PAIN OF RIGHT KNEE: ICD-10-CM

## 2025-04-20 DIAGNOSIS — M25.561 CHRONIC PAIN OF RIGHT KNEE: ICD-10-CM

## 2025-04-20 DIAGNOSIS — G89.29 CHRONIC PAIN OF LEFT KNEE: ICD-10-CM

## 2025-04-21 RX ORDER — TRAMADOL HYDROCHLORIDE 50 MG/1
50 TABLET ORAL
Qty: 60 TABLET | Refills: 0 | Status: SHIPPED | OUTPATIENT
Start: 2025-04-21

## 2025-04-21 RX ORDER — TRAMADOL HYDROCHLORIDE 50 MG/1
50 TABLET ORAL EVERY 6 HOURS PRN
Qty: 60 TABLET | Refills: 2 | OUTPATIENT
Start: 2025-04-21

## 2025-04-22 DIAGNOSIS — M25.562 CHRONIC PAIN OF LEFT KNEE: ICD-10-CM

## 2025-04-22 DIAGNOSIS — G89.29 CHRONIC PAIN OF LEFT KNEE: ICD-10-CM

## 2025-04-22 DIAGNOSIS — K21.9 GASTROESOPHAGEAL REFLUX DISEASE, UNSPECIFIED WHETHER ESOPHAGITIS PRESENT: ICD-10-CM

## 2025-04-22 DIAGNOSIS — G89.29 CHRONIC PAIN OF RIGHT KNEE: ICD-10-CM

## 2025-04-22 DIAGNOSIS — M25.561 CHRONIC PAIN OF RIGHT KNEE: ICD-10-CM

## 2025-04-22 DIAGNOSIS — M54.50 LUMBAR BACK PAIN: ICD-10-CM

## 2025-04-22 RX ORDER — OMEPRAZOLE 40 MG/1
40 CAPSULE, DELAYED RELEASE ORAL DAILY
Qty: 30 CAPSULE | Refills: 2 | Status: SHIPPED | OUTPATIENT
Start: 2025-04-22

## 2025-05-03 DIAGNOSIS — M54.50 LUMBAR BACK PAIN: ICD-10-CM

## 2025-05-03 DIAGNOSIS — G89.29 CHRONIC PAIN OF LEFT KNEE: ICD-10-CM

## 2025-05-03 DIAGNOSIS — M25.562 CHRONIC PAIN OF LEFT KNEE: ICD-10-CM

## 2025-05-03 DIAGNOSIS — M25.561 CHRONIC PAIN OF RIGHT KNEE: ICD-10-CM

## 2025-05-03 DIAGNOSIS — G89.29 CHRONIC PAIN OF RIGHT KNEE: ICD-10-CM

## 2025-05-05 RX ORDER — TRAMADOL HYDROCHLORIDE 50 MG/1
50 TABLET ORAL
Qty: 60 TABLET | Refills: 1 | Status: SHIPPED | OUTPATIENT
Start: 2025-05-05

## 2025-05-05 NOTE — TELEPHONE ENCOUNTER
========================  Cristiano reviewed 5/5/2025 . Follow up appt is scheduled on 5/8/2025 .    Last office visit with me : 2/6/2025

## 2025-05-06 DIAGNOSIS — M25.562 CHRONIC PAIN OF LEFT KNEE: ICD-10-CM

## 2025-05-06 DIAGNOSIS — G62.9 NEUROPATHY: ICD-10-CM

## 2025-05-06 DIAGNOSIS — G89.29 CHRONIC PAIN OF LEFT KNEE: ICD-10-CM

## 2025-05-06 RX ORDER — GABAPENTIN 100 MG/1
100 CAPSULE ORAL 3 TIMES DAILY
Qty: 90 CAPSULE | Refills: 1 | Status: SHIPPED | OUTPATIENT
Start: 2025-05-06

## 2025-05-06 NOTE — TELEPHONE ENCOUNTER
========================  Cristiano reviewed 5/6/2025 . Follow up appt is scheduled on 5/8/2025 .    Last office visit with me : 2/6/2025

## 2025-05-08 ENCOUNTER — OFFICE VISIT (OUTPATIENT)
Dept: FAMILY MEDICINE CLINIC | Facility: CLINIC | Age: 60
End: 2025-05-08
Payer: COMMERCIAL

## 2025-05-08 VITALS
BODY MASS INDEX: 41.99 KG/M2 | HEART RATE: 80 BPM | OXYGEN SATURATION: 97 % | SYSTOLIC BLOOD PRESSURE: 132 MMHG | TEMPERATURE: 97.4 F | DIASTOLIC BLOOD PRESSURE: 86 MMHG | HEIGHT: 65 IN | WEIGHT: 252 LBS | RESPIRATION RATE: 16 BRPM

## 2025-05-08 DIAGNOSIS — G89.29 CHRONIC PAIN OF LEFT KNEE: ICD-10-CM

## 2025-05-08 DIAGNOSIS — N28.9 RENAL INSUFFICIENCY: ICD-10-CM

## 2025-05-08 DIAGNOSIS — F41.9 ANXIETY: Primary | ICD-10-CM

## 2025-05-08 DIAGNOSIS — G62.9 NEUROPATHY: ICD-10-CM

## 2025-05-08 DIAGNOSIS — I10 ESSENTIAL HYPERTENSION: ICD-10-CM

## 2025-05-08 DIAGNOSIS — M79.672 LEFT FOOT PAIN: ICD-10-CM

## 2025-05-08 DIAGNOSIS — M25.562 CHRONIC PAIN OF LEFT KNEE: ICD-10-CM

## 2025-05-08 PROCEDURE — 3079F DIAST BP 80-89 MM HG: CPT | Performed by: FAMILY MEDICINE

## 2025-05-08 PROCEDURE — 1160F RVW MEDS BY RX/DR IN RCRD: CPT | Performed by: FAMILY MEDICINE

## 2025-05-08 PROCEDURE — 99214 OFFICE O/P EST MOD 30 MIN: CPT | Performed by: FAMILY MEDICINE

## 2025-05-08 PROCEDURE — 3075F SYST BP GE 130 - 139MM HG: CPT | Performed by: FAMILY MEDICINE

## 2025-05-08 PROCEDURE — 1159F MED LIST DOCD IN RCRD: CPT | Performed by: FAMILY MEDICINE

## 2025-05-08 PROCEDURE — 1125F AMNT PAIN NOTED PAIN PRSNT: CPT | Performed by: FAMILY MEDICINE

## 2025-05-08 NOTE — PROGRESS NOTES
Chief Complaint  3mo f/u anxiety    Subjective          Jordyn Anika Machado presents to [facility]    HPI         The patient is a 59-year-old female presenting for follow-up of anxiety, chronic pain, and hypertension.    She reports mild nausea today. Anxiety is well-managed with citalopram at night and diazepam as needed. She takes diazepam once in the morning and an additional dose in the evening if needed. No depression. Continues gabapentin for chronic knee pain and neuropathy, and tramadol for knee pain relief. No medication refills needed.    Acknowledges need for knee surgery but has postponed it. No current orthopedic doctor. Pain in right knee due to severe arthritis; arthritis in left knee as well. Previously consulted orthopedic doctor for right knee injections. Three surgeries on left knee. Numbness and tingling in feet triggered by certain sitting positions. Metal yazmin in tibia. Fall in hospital may have slightly displaced surgical correction. Despite physical therapy post-surgery, continued pain during ambulation and occasional popping sounds.    Seeking referral to foot surgeon in [place] due to worsening foot symptoms, including a bulge causing discomfort in shoes. History of bunion repair surgery in [place].    Continues lisinopril and hydrochlorothiazide for blood pressure management. No chest pain or shortness of breath.    Had to quit seeing eye doctor due to cost. Interested in weight management doctor but unsure if insurance will cover.       OTHER NOTES:          Review of Systems   Constitutional:  Negative for chills, diaphoresis, fatigue and fever.   HENT:  Negative for congestion, sore throat and swollen glands.    Respiratory: Negative.  Negative for cough and shortness of breath.    Cardiovascular:  Positive for leg swelling (foot swelling). Negative for chest pain.   Gastrointestinal:  Negative for abdominal pain, nausea and vomiting.  "  Genitourinary:  Negative for dysuria.   Musculoskeletal:  Positive for myalgias. Negative for neck pain.   Skin:  Negative for rash.   Neurological:  Positive for weakness and numbness.        Objective       Vital Signs:   /86   Pulse 80   Temp 97.4 °F (36.3 °C)   Resp 16   Ht 165.1 cm (65\")   Wt 114 kg (252 lb)   SpO2 97%   BMI 41.93 kg/m²     Physical Exam  Vitals and nursing note reviewed.   Constitutional:       General: She is not in acute distress.     Appearance: Normal appearance. She is not ill-appearing or toxic-appearing.   HENT:      Head: Normocephalic.   Musculoskeletal:      Right knee: Bony tenderness and crepitus present. Decreased range of motion. Tenderness present over the medial joint line and lateral joint line.      Left knee: Bony tenderness and crepitus present. Decreased range of motion. Tenderness present over the medial joint line and lateral joint line.   Neurological:      Mental Status: She is alert.   Psychiatric:         Mood and Affect: Mood normal.         Behavior: Behavior normal.        Left foot, scar over 1st MTP  Some swelling over 1st MTP       Respiratory: Clear to auscultation, no wheezing, rales, or rhonchi.  Cardiovascular: Regular rate and rhythm, no murmurs, rubs, or gallops.  Musculoskeletal: Tenderness in left knee and right knee upon palpation. Crepitus noted in knees.       Result Review :            Other Results    Results  - Labs:    - Kidney function: Slightly decreased compared to previous    - Blood count: Normal    - Cholesterol: Slightly higher    - HDL: High             Assessment and Plan    Diagnoses and all orders for this visit:    1. Anxiety (Primary)    2. Neuropathy    3. Chronic pain of left knee    4. Left foot pain  -     Ambulatory Referral to Podiatry    5. Essential hypertension    6. Renal insufficiency  -     Basic Metabolic Panel               DISCUSSION       Anxiety.  Well-managed with citalopram at night and diazepam as " needed. No changes to medication. Prescription Drug Monitoring Program reviewed.    Chronic Pain.  Chronic knee pain and neuropathy in feet. Gabapentin and tramadol for pain management. No changes to medication.  Taking medication appropriately without evidence of misuse or diversion.    Left Foot Pain.  Worsening pain and bulge in left foot, making shoes uncomfortable. History of bunion repair surgery and hairline fracture. Referral to podiatrist in [place] for further evaluation. Physical exam: Tenderness and puffiness in left foot.    Hypertension.  Well-controlled on lisinopril and hydrochlorothiazide. No changes to medication. No chest pain or shortness of breath.    Health Maintenance.  Kidney function slightly decreased. Blood count normal. Cholesterol marginally elevated, HDL high. Recheck kidney function today.    Follow-up in 3 months.       Cristiano dated 5/8/2025  was reviewed and appropriate.     Follow Up   Return in about 3 months (around 8/8/2025).    Patient was given instructions and counseling regarding her condition or for health maintenance advice. Please see specific information pulled into the AVS if appropriate.       Dl Lucas MD    Patient or patient representative verbalized consent for the use of Ambient Listening during the visit with  Dl Lucas MD for chart documentation. 5/8/2025  17:46 EDT

## 2025-05-09 LAB
BUN SERPL-MCNC: 24 MG/DL (ref 6–24)
BUN/CREAT SERPL: 26 (ref 9–23)
CALCIUM SERPL-MCNC: 9.1 MG/DL (ref 8.7–10.2)
CHLORIDE SERPL-SCNC: 102 MMOL/L (ref 96–106)
CO2 SERPL-SCNC: 23 MMOL/L (ref 20–29)
CREAT SERPL-MCNC: 0.91 MG/DL (ref 0.57–1)
EGFRCR SERPLBLD CKD-EPI 2021: 74 ML/MIN/1.73
GLUCOSE SERPL-MCNC: 80 MG/DL (ref 70–99)
POTASSIUM SERPL-SCNC: 4.3 MMOL/L (ref 3.5–5.2)
SODIUM SERPL-SCNC: 142 MMOL/L (ref 134–144)

## 2025-05-27 ENCOUNTER — TELEPHONE (OUTPATIENT)
Dept: FAMILY MEDICINE CLINIC | Facility: CLINIC | Age: 60
End: 2025-05-27
Payer: COMMERCIAL

## 2025-05-27 DIAGNOSIS — F41.9 ANXIETY: ICD-10-CM

## 2025-05-27 RX ORDER — DIAZEPAM 5 MG/1
5 TABLET ORAL EVERY 12 HOURS PRN
Qty: 30 TABLET | Refills: 3 | Status: SHIPPED | OUTPATIENT
Start: 2025-05-27

## 2025-05-27 NOTE — TELEPHONE ENCOUNTER
========================  Cristiano reviewed 5/27/2025 . Follow up appt is scheduled on 8/8/2025 .    Last office visit with me : 5/8/2025

## 2025-06-02 DIAGNOSIS — M54.50 LUMBAR BACK PAIN: ICD-10-CM

## 2025-06-02 DIAGNOSIS — G89.29 CHRONIC PAIN OF LEFT KNEE: ICD-10-CM

## 2025-06-02 DIAGNOSIS — G89.29 CHRONIC PAIN OF RIGHT KNEE: ICD-10-CM

## 2025-06-02 DIAGNOSIS — M25.561 CHRONIC PAIN OF RIGHT KNEE: ICD-10-CM

## 2025-06-02 DIAGNOSIS — M25.562 CHRONIC PAIN OF LEFT KNEE: ICD-10-CM

## 2025-06-02 RX ORDER — TRAMADOL HYDROCHLORIDE 50 MG/1
50 TABLET ORAL
Qty: 60 TABLET | Refills: 2 | Status: SHIPPED | OUTPATIENT
Start: 2025-06-02

## 2025-06-02 NOTE — TELEPHONE ENCOUNTER
========================  Cristiano reviewed 6/2/2025 . Follow up appt is scheduled on 8/8/2025 .    Last office visit with me : 5/8/2025

## 2025-07-05 DIAGNOSIS — M25.562 CHRONIC PAIN OF LEFT KNEE: ICD-10-CM

## 2025-07-05 DIAGNOSIS — G89.29 CHRONIC PAIN OF LEFT KNEE: ICD-10-CM

## 2025-07-05 DIAGNOSIS — G62.9 NEUROPATHY: ICD-10-CM

## 2025-07-07 DIAGNOSIS — M25.562 CHRONIC PAIN OF LEFT KNEE: ICD-10-CM

## 2025-07-07 DIAGNOSIS — G89.29 CHRONIC PAIN OF LEFT KNEE: ICD-10-CM

## 2025-07-07 DIAGNOSIS — G62.9 NEUROPATHY: ICD-10-CM

## 2025-07-07 RX ORDER — GABAPENTIN 100 MG/1
100 CAPSULE ORAL 3 TIMES DAILY
Qty: 90 CAPSULE | Refills: 2 | Status: SHIPPED | OUTPATIENT
Start: 2025-07-07

## 2025-07-07 NOTE — TELEPHONE ENCOUNTER
========================  Cristiano reviewed 7/7/2025 . Follow up appt is scheduled on 7/7/2025 .    Last office visit with me : 5/8/2025

## 2025-07-07 NOTE — TELEPHONE ENCOUNTER
========================  Cristiano reviewed 7/7/2025 . Follow up appt is scheduled on 7/10/2025 .    Last office visit with me : 5/8/2025

## 2025-07-08 RX ORDER — MELOXICAM 15 MG/1
15 TABLET ORAL DAILY
Qty: 30 TABLET | Refills: 2 | Status: CANCELLED | OUTPATIENT
Start: 2025-07-08

## 2025-07-10 ENCOUNTER — OFFICE VISIT (OUTPATIENT)
Dept: FAMILY MEDICINE CLINIC | Facility: CLINIC | Age: 60
End: 2025-07-10
Payer: OTHER MISCELLANEOUS

## 2025-07-10 VITALS
DIASTOLIC BLOOD PRESSURE: 96 MMHG | HEIGHT: 65 IN | SYSTOLIC BLOOD PRESSURE: 158 MMHG | WEIGHT: 253 LBS | BODY MASS INDEX: 42.15 KG/M2 | HEART RATE: 86 BPM | TEMPERATURE: 98.4 F | RESPIRATION RATE: 18 BRPM
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
## 2025-07-10 DIAGNOSIS — S39.92XA INJURY OF LOW BACK, INITIAL ENCOUNTER: ICD-10-CM

## 2025-07-10 DIAGNOSIS — M54.9 UPPER BACK PAIN: ICD-10-CM

## 2025-07-10 RX ORDER — MELOXICAM 15 MG/1
15 TABLET ORAL DAILY
Qty: 30 TABLET | Refills: 2 | Status: SHIPPED | OUTPATIENT
Start: 2025-07-10

## 2025-07-10 RX ORDER — MELOXICAM 15 MG/1
15 TABLET ORAL DAILY
Qty: 30 TABLET | Refills: 2 | Status: CANCELLED | OUTPATIENT
Start: 2025-07-10

## 2025-07-10 RX ORDER — PREDNISONE 10 MG/1
TABLET ORAL
Qty: 20 TABLET | Refills: 0 | Status: SHIPPED | OUTPATIENT
Start: 2025-07-10
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
## 2025-07-13 DIAGNOSIS — G89.29 CHRONIC PAIN OF RIGHT KNEE: ICD-10-CM

## 2025-07-13 DIAGNOSIS — M54.50 LUMBAR BACK PAIN: ICD-10-CM

## 2025-07-13 DIAGNOSIS — F41.9 ANXIETY: ICD-10-CM

## 2025-07-13 DIAGNOSIS — M25.561 CHRONIC PAIN OF RIGHT KNEE: ICD-10-CM

## 2025-07-13 DIAGNOSIS — M25.562 CHRONIC PAIN OF LEFT KNEE: ICD-10-CM

## 2025-07-13 DIAGNOSIS — G89.29 CHRONIC PAIN OF LEFT KNEE: ICD-10-CM

## 2025-07-14 RX ORDER — TRAMADOL HYDROCHLORIDE 50 MG/1
50 TABLET ORAL
Qty: 60 TABLET | Refills: 2 | Status: SHIPPED | OUTPATIENT
Start: 2025-07-14

## 2025-07-14 RX ORDER — DIAZEPAM 5 MG/1
5 TABLET ORAL EVERY 12 HOURS PRN
Qty: 30 TABLET | Refills: 3 | Status: SHIPPED | OUTPATIENT
Start: 2025-07-14

## 2025-07-14 NOTE — TELEPHONE ENCOUNTER
========================  Cristiano reviewed 7/14/2025 . Follow up appt is scheduled on 8/8/2025 .    Last office visit with me : 7/10/2025

## 2025-07-17 DIAGNOSIS — K21.9 GASTROESOPHAGEAL REFLUX DISEASE, UNSPECIFIED WHETHER ESOPHAGITIS PRESENT: ICD-10-CM

## 2025-07-17 RX ORDER — LISINOPRIL AND HYDROCHLOROTHIAZIDE 12.5; 2 MG/1; MG/1
1 TABLET ORAL DAILY
Qty: 90 TABLET | Refills: 0 | Status: SHIPPED | OUTPATIENT
Start: 2025-07-17

## 2025-07-17 RX ORDER — OMEPRAZOLE 40 MG/1
40 CAPSULE, DELAYED RELEASE ORAL DAILY
Qty: 30 CAPSULE | Refills: 2 | Status: SHIPPED | OUTPATIENT
Start: 2025-07-17

## 2025-07-21 DIAGNOSIS — G89.29 CHRONIC PAIN OF RIGHT KNEE: ICD-10-CM

## 2025-07-21 DIAGNOSIS — G89.29 CHRONIC PAIN OF LEFT KNEE: ICD-10-CM

## 2025-07-21 DIAGNOSIS — M25.562 CHRONIC PAIN OF LEFT KNEE: ICD-10-CM

## 2025-07-21 DIAGNOSIS — M25.561 CHRONIC PAIN OF RIGHT KNEE: ICD-10-CM

## 2025-07-21 DIAGNOSIS — M54.50 LUMBAR BACK PAIN: ICD-10-CM

## 2025-08-08 ENCOUNTER — TELEMEDICINE (OUTPATIENT)
Dept: FAMILY MEDICINE CLINIC | Facility: CLINIC | Age: 60
End: 2025-08-08
Payer: MEDICARE

## 2025-08-08 DIAGNOSIS — F41.9 ANXIETY: Primary | ICD-10-CM

## 2025-08-08 DIAGNOSIS — G62.9 NEUROPATHY: ICD-10-CM

## 2025-08-08 DIAGNOSIS — M54.50 LUMBAR BACK PAIN: ICD-10-CM

## 2025-08-08 DIAGNOSIS — M25.562 CHRONIC PAIN OF LEFT KNEE: ICD-10-CM

## 2025-08-08 DIAGNOSIS — G89.29 CHRONIC PAIN OF LEFT KNEE: ICD-10-CM

## 2025-08-08 DIAGNOSIS — I10 ESSENTIAL HYPERTENSION: ICD-10-CM

## 2025-08-08 DIAGNOSIS — G89.29 CHRONIC PAIN OF RIGHT KNEE: ICD-10-CM

## 2025-08-08 DIAGNOSIS — M25.561 CHRONIC PAIN OF RIGHT KNEE: ICD-10-CM

## 2025-08-08 RX ORDER — TRAMADOL HYDROCHLORIDE 50 MG/1
50 TABLET ORAL
Qty: 60 TABLET | Refills: 2 | Status: SHIPPED | OUTPATIENT
Start: 2025-08-08

## 2025-08-08 RX ORDER — DIAZEPAM 5 MG/1
5 TABLET ORAL EVERY 12 HOURS PRN
Qty: 30 TABLET | Refills: 4 | Status: SHIPPED | OUTPATIENT
Start: 2025-08-08

## 2025-08-08 RX ORDER — LISINOPRIL AND HYDROCHLOROTHIAZIDE 12.5; 2 MG/1; MG/1
1 TABLET ORAL DAILY
Qty: 90 TABLET | Refills: 1 | Status: SHIPPED | OUTPATIENT
Start: 2025-08-08

## 2025-08-08 RX ORDER — GABAPENTIN 100 MG/1
100 CAPSULE ORAL 3 TIMES DAILY
Qty: 90 CAPSULE | Refills: 2 | Status: SHIPPED | OUTPATIENT
Start: 2025-08-08